# Patient Record
Sex: FEMALE | Race: WHITE | NOT HISPANIC OR LATINO | Employment: UNEMPLOYED | URBAN - METROPOLITAN AREA
[De-identification: names, ages, dates, MRNs, and addresses within clinical notes are randomized per-mention and may not be internally consistent; named-entity substitution may affect disease eponyms.]

---

## 2017-09-08 ENCOUNTER — PATIENT OUTREACH (OUTPATIENT)
Dept: ADMINISTRATIVE | Facility: HOSPITAL | Age: 22
End: 2017-09-08

## 2017-09-08 NOTE — LETTER
September 8, 2017    Cony Lora  [address]  [facility] [address]             [facility]  [address]  [facility] [address]  Phone: [phone] Dear MsMartha Lora:    [facility] is committed to your overall health.  To help you get the most out of each of your visits, we will review your information to make sure you are up to date on all of your recommended tests and/or procedures.      Laron Schreiber MD has found that you may be due for   Health Maintenance Due   Topic    Lipid Panel     HPV Vaccines (1 of 3 - Female 3 Dose Series)    Pap Smear     Influenza Vaccine         If you have had any of the above done at another facility, please bring the records or information with you so that your record at [facility] will be complete.    If you are currently taking medication, please bring it with you to your appointment for review.    We will be happy to assist you with scheduling any necessary appointments or you may contact the [facility] appointment desk at [phone] to schedule at your convenience.     Thank you for choosing [facility] for your healthcare needs,    Moni HENRY LPN Care Coordinator  [facility]  [phone]

## 2017-09-08 NOTE — LETTER
September 8, 2017    Cony Odessasean Lora  [address]  [facility] [address]             [facility]  [address]  [facility] [address]  Phone: [phone] Dear MsMartha Lora:    [facility] is committed to your overall health.  To help you get the most out of each of your visits, we will review your information to make sure you are up to date on all of your recommended tests and/or procedures.      DR. MALINDA BARDALES has found that you may be due for   Health Maintenance Due   Topic    Lipid Panel     HPV Vaccines (1 of 3 - Female 3 Dose Series)    Pap Smear     Influenza Vaccine         If you have had any of the above done at another facility, please bring the records or information with you so that your record at [facility] will be complete.    If you are currently taking medication, please bring it with you to your appointment for review.    We will be happy to assist you with scheduling any necessary appointments or you may contact the [facility] appointment desk at [phone] to schedule at your convenience.     Thank you for choosing [facility] for your healthcare needs,    Moni HENRY, LPN Care Coordinator  [facility]  [phone]

## 2017-09-22 ENCOUNTER — OFFICE VISIT (OUTPATIENT)
Dept: INTERNAL MEDICINE | Facility: CLINIC | Age: 22
End: 2017-09-22
Payer: MEDICAID

## 2017-09-22 ENCOUNTER — LAB VISIT (OUTPATIENT)
Dept: LAB | Facility: HOSPITAL | Age: 22
End: 2017-09-22
Attending: FAMILY MEDICINE
Payer: MEDICAID

## 2017-09-22 VITALS
HEIGHT: 67 IN | OXYGEN SATURATION: 100 % | BODY MASS INDEX: 30.97 KG/M2 | WEIGHT: 197.31 LBS | HEART RATE: 102 BPM | TEMPERATURE: 97 F | DIASTOLIC BLOOD PRESSURE: 70 MMHG | SYSTOLIC BLOOD PRESSURE: 120 MMHG

## 2017-09-22 DIAGNOSIS — F41.1 GAD (GENERALIZED ANXIETY DISORDER): ICD-10-CM

## 2017-09-22 DIAGNOSIS — Z00.00 ROUTINE GENERAL MEDICAL EXAMINATION AT A HEALTH CARE FACILITY: Primary | ICD-10-CM

## 2017-09-22 DIAGNOSIS — Z00.00 ROUTINE GENERAL MEDICAL EXAMINATION AT A HEALTH CARE FACILITY: ICD-10-CM

## 2017-09-22 DIAGNOSIS — E66.9 OBESITY (BMI 30.0-34.9): ICD-10-CM

## 2017-09-22 LAB
ALBUMIN SERPL BCP-MCNC: 3.7 G/DL
ALP SERPL-CCNC: 83 U/L
ALT SERPL W/O P-5'-P-CCNC: 12 U/L
ANION GAP SERPL CALC-SCNC: 11 MMOL/L
AST SERPL-CCNC: 15 U/L
BASOPHILS # BLD AUTO: 0.02 K/UL
BASOPHILS NFR BLD: 0.2 %
BILIRUB SERPL-MCNC: 0.7 MG/DL
BUN SERPL-MCNC: 14 MG/DL
CALCIUM SERPL-MCNC: 9.4 MG/DL
CHLORIDE SERPL-SCNC: 106 MMOL/L
CHOLEST SERPL-MCNC: 248 MG/DL
CHOLEST/HDLC SERPL: 5.4 {RATIO}
CO2 SERPL-SCNC: 23 MMOL/L
CREAT SERPL-MCNC: 0.9 MG/DL
DIFFERENTIAL METHOD: ABNORMAL
EOSINOPHIL # BLD AUTO: 0.1 K/UL
EOSINOPHIL NFR BLD: 0.8 %
ERYTHROCYTE [DISTWIDTH] IN BLOOD BY AUTOMATED COUNT: 13.3 %
EST. GFR  (AFRICAN AMERICAN): >60 ML/MIN/1.73 M^2
EST. GFR  (NON AFRICAN AMERICAN): >60 ML/MIN/1.73 M^2
GLUCOSE SERPL-MCNC: 109 MG/DL
HCT VFR BLD AUTO: 40.1 %
HDLC SERPL-MCNC: 46 MG/DL
HDLC SERPL: 18.5 %
HGB BLD-MCNC: 13.5 G/DL
LDLC SERPL CALC-MCNC: 171.4 MG/DL
LYMPHOCYTES # BLD AUTO: 2.5 K/UL
LYMPHOCYTES NFR BLD: 26.2 %
MCH RBC QN AUTO: 29.7 PG
MCHC RBC AUTO-ENTMCNC: 33.7 G/DL
MCV RBC AUTO: 88 FL
MONOCYTES # BLD AUTO: 0.4 K/UL
MONOCYTES NFR BLD: 4.2 %
NEUTROPHILS # BLD AUTO: 6.6 K/UL
NEUTROPHILS NFR BLD: 68.3 %
NONHDLC SERPL-MCNC: 202 MG/DL
PLATELET # BLD AUTO: 374 K/UL
PMV BLD AUTO: 9 FL
POTASSIUM SERPL-SCNC: 3.9 MMOL/L
PROT SERPL-MCNC: 7.6 G/DL
RBC # BLD AUTO: 4.55 M/UL
SODIUM SERPL-SCNC: 140 MMOL/L
TRIGL SERPL-MCNC: 153 MG/DL
TSH SERPL DL<=0.005 MIU/L-ACNC: 2.7 UIU/ML
WBC # BLD AUTO: 9.68 K/UL

## 2017-09-22 PROCEDURE — 99999 PR PBB SHADOW E&M-EST. PATIENT-LVL IV: CPT | Mod: PBBFAC,,, | Performed by: FAMILY MEDICINE

## 2017-09-22 PROCEDURE — 85025 COMPLETE CBC W/AUTO DIFF WBC: CPT

## 2017-09-22 PROCEDURE — 90471 IMMUNIZATION ADMIN: CPT | Mod: PBBFAC,PO

## 2017-09-22 PROCEDURE — 99214 OFFICE O/P EST MOD 30 MIN: CPT | Mod: PBBFAC,PO | Performed by: FAMILY MEDICINE

## 2017-09-22 PROCEDURE — 36415 COLL VENOUS BLD VENIPUNCTURE: CPT | Mod: PO

## 2017-09-22 PROCEDURE — 99385 PREV VISIT NEW AGE 18-39: CPT | Mod: 25,S$PBB,, | Performed by: FAMILY MEDICINE

## 2017-09-22 PROCEDURE — 80053 COMPREHEN METABOLIC PANEL: CPT

## 2017-09-22 PROCEDURE — 84443 ASSAY THYROID STIM HORMONE: CPT

## 2017-09-22 PROCEDURE — 80061 LIPID PANEL: CPT

## 2017-09-22 RX ORDER — BUPROPION HYDROCHLORIDE 100 MG/1
100 TABLET ORAL DAILY
Qty: 30 TABLET | Refills: 1 | Status: SHIPPED | OUTPATIENT
Start: 2017-09-22 | End: 2019-04-04 | Stop reason: ALTCHOICE

## 2017-09-22 NOTE — PROGRESS NOTES
"Subjective:       Patient ID: Cony Lora is a 22 y.o. female.    Chief Complaint: Annual Exam    22-year-old female patient new to my clinic here to establish care previously seen by Dr. Laron Schreiber.   Patient with Patient Active Problem List:     ARMANDO (generalized anxiety disorder)  Reports that she's been taking Xanax daily.   Has not tried any daily medications long-term in the past  Reports that she stays anxious and occasionally has been depressed and would like to see psychiatry and psychology.  Denies of any suicidal or homicidal thoughts  Menstrual cycles have been stable, will be due for Pap smear this October in [facility].   Denies of any other complaints today           Review of Systems   Constitutional: Negative for fatigue.   Eyes: Negative for visual disturbance.   Respiratory: Negative for shortness of breath.    Cardiovascular: Negative for chest pain, palpitations and leg swelling.   Gastrointestinal: Negative for abdominal pain, nausea and vomiting.   Genitourinary: Negative for menstrual problem.   Musculoskeletal: Negative for myalgias.   Skin: Negative for rash.   Neurological: Negative for light-headedness and headaches.   Psychiatric/Behavioral: Negative for behavioral problems and sleep disturbance. The patient is nervous/anxious.          /70   Pulse 102   Temp 96.9 °F (36.1 °C) (Tympanic)   Ht 5' 7" (1.702 m)   Wt 89.5 kg (197 lb 5 oz)   LMP 09/15/2017   SpO2 100%   BMI 30.90 kg/m²   Objective:      Physical Exam   Constitutional: She is oriented to person, place, and time. She appears well-developed and well-nourished.   HENT:   Head: Normocephalic and atraumatic.   Mouth/Throat: Oropharynx is clear and moist.   Cardiovascular: Normal rate, regular rhythm and normal heart sounds.    No murmur heard.  Pulmonary/Chest: Effort normal and breath sounds normal. She has no wheezes.   Abdominal: Soft. Bowel sounds are normal. There is no tenderness. "   Musculoskeletal: She exhibits no edema.   Neurological: She is alert and oriented to person, place, and time.   Skin: Skin is warm and dry. No rash noted.   Psychiatric: She has a normal mood and affect.         Assessment:       1. Routine general medical examination at a health care facility    2. ARMANDO (generalized anxiety disorder)    3. Obesity (BMI 30.0-34.9)        Plan:   Routine general medical examination at a health care facility  -     CBC auto differential; Future; Expected date: 09/22/2017  -     Comprehensive metabolic panel; Future; Expected date: 09/22/2017  -     Lipid panel; Future; Expected date: 09/22/2017  -     TSH; Future; Expected date: 09/22/2017  -     Urinalysis; Future; Expected date: 09/22/2017  Vital signs stable today.  Clinical exam stable.   Will check fasting labs today  Encouraged to maintain lifestyle modifications with low-fat and low-cholesterol diet and exercise 30 minutes daily to lose weight with BMI 30    ARMANDO (generalized anxiety disorder)  -     TSH; Future; Expected date: 09/22/2017  -     buPROPion (WELLBUTRIN) 100 MG tablet; Take 1 tablet (100 mg total) by mouth once daily.  Dispense: 30 tablet; Refill: 1  -     Ambulatory referral to Psychiatry  -     Ambulatory referral to Psychology  Will start on Wellbutrin 100 mg daily, patient was encouraged to take Xanax only as needed but not daily.   Will refer to psychiatry and psychology for further evaluation and management  If having any side effects taking this medication, please let us know, and if he can get into [facility] schedule, if cannot see psychiatry in a month, will try to bring her back in a month.     Obesity (BMI 30.0-34.9)-lifestyle modifications recommended with diet and exercise to lose weight with BMI 30    Other orders  -     Influenza - Quadrivalent (3 years & older) (PF)  Flu shot given today

## 2017-09-22 NOTE — LETTER
September 22, 2017      [facility]  [address] [facility] [address]           [facility]  [address]  [place] [address]  Phone: [phone]  Fax: [phone]          Patient: Cony Lora   MR Number: [id number]   YOB: 1995   Date of Visit: 9/22/2017       Dear [facility]:    Thank you for referring Cony Lora to me for evaluation. Attached you will find relevant portions of my assessment and plan of care.    If you have questions, please do not hesitate to call me. I look forward to following Cony Lora along with you.    Sincerely,    Hilda Kennedy MD    Enclosure  CC:  No Recipients    If you would like to receive this communication electronically, please contact [email or web address] or [phone] to request more information on [facility] Link access.    For providers and/or their staff who would like to refer a patient to [facility], please contact us through our one-stop-shop provider referral line, Jorge Lema, at [phone].    If you feel you have received this communication in error or would no longer like to receive these types of communications, please e-mail [email or web address]

## 2017-10-12 ENCOUNTER — OFFICE VISIT (OUTPATIENT)
Dept: PSYCHIATRY | Facility: CLINIC | Age: 22
End: 2017-10-12
Payer: MEDICAID

## 2017-10-12 DIAGNOSIS — F41.1 GAD (GENERALIZED ANXIETY DISORDER): Primary | ICD-10-CM

## 2017-10-12 DIAGNOSIS — Z63.9 FAMILY DYNAMICS PROBLEM: ICD-10-CM

## 2017-10-12 PROCEDURE — 90791 PSYCH DIAGNOSTIC EVALUATION: CPT | Mod: AJ,HB,S$PBB, | Performed by: SOCIAL WORKER

## 2017-10-12 PROCEDURE — 90791 PSYCH DIAGNOSTIC EVALUATION: CPT | Mod: PBBFAC,PO | Performed by: SOCIAL WORKER

## 2017-10-12 SDOH — SOCIAL DETERMINANTS OF HEALTH (SDOH): PROBLEM RELATED TO PRIMARY SUPPORT GROUP, UNSPECIFIED: Z63.9
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
## 2017-11-03 ENCOUNTER — OFFICE VISIT (OUTPATIENT)
Dept: PSYCHIATRY | Facility: CLINIC | Age: 22
End: 2017-11-03
Payer: MEDICAID

## 2017-11-03 DIAGNOSIS — F41.1 GAD (GENERALIZED ANXIETY DISORDER): Primary | ICD-10-CM

## 2017-11-03 DIAGNOSIS — Z63.9 FAMILY DYNAMICS PROBLEM: ICD-10-CM

## 2017-11-03 PROCEDURE — 90834 PSYTX W PT 45 MINUTES: CPT | Mod: PBBFAC,PO | Performed by: SOCIAL WORKER

## 2017-11-03 PROCEDURE — 90834 PSYTX W PT 45 MINUTES: CPT | Mod: AJ,HB,S$PBB, | Performed by: SOCIAL WORKER

## 2017-11-03 SDOH — SOCIAL DETERMINANTS OF HEALTH (SDOH): PROBLEM RELATED TO PRIMARY SUPPORT GROUP, UNSPECIFIED: Z63.9

## 2017-11-15 NOTE — PROGRESS NOTES
Individual Psychotherapy (PhD/LCSW)    11/3/2017    Site:  [place]         Therapeutic Intervention: Met with patient.  Outpatient - Insight oriented psychotherapy 45 min - CPT code 66839 and Outpatient - Supportive psychotherapy 45 min - CPT Code 70884    Chief complaint/reason for encounter: anxiety and interpersonal     Interval history and content of current session:   22 year old female patient returned to clinic for follow up psychotherapy to address chronic generalized anxiety, familial interpersonal conflicts and need for improved coping skills.  Patient presented alert and oriented for her second session, casually dressed, appropriately groomed, pleasant but tense.  Patient reported she has been weaning herself down on her Xanax dose, down to 0.25mg every one to three days.  She talked about conflict with her father, which she described as continuing to be volatile, despite allegedly having stopped alcohol consumption.  She remains somewhat concerned for her mother, who she said just seems more sad lately.  Patient contiues to be dependent on them financially, with goal of calming down enough to get and maintain gainful employment, rather than quitting at the first sign of job stress, as has been her pattern.  Denied any depression, si/hi, psychosis, mood swings, or substance abuase.      Treatment plan:  · Target symptoms: anxiety , adjustment, interpersonal  · Why chosen therapy is appropriate versus another modality: relevant to diagnosis  · Outcome monitoring methods: self-report, observation  · Therapeutic intervention type: insight oriented psychotherapy    Risk parameters:  Patient reports no suicidal ideation  Patient reports no homicidal ideation  Patient reports no self-injurious behavior  Patient reports no violent behavior    Verbal deficits: None    Patient's response to intervention:  The patient's response to intervention is accepting.    Progress toward goals and other mental status  changes:  The patient's progress toward goals is limited.    Diagnosis:     ICD-10-CM ICD-9-CM   1. ARMANDO (generalized anxiety disorder) F41.1 300.02   2. Family dynamics problem Z63.9 V61.9       Plan:  individual psychotherapy and medication management by physician    Return to clinic: as scheduled    Length of Service (minutes): 45

## 2017-12-15 ENCOUNTER — PATIENT MESSAGE (OUTPATIENT)
Dept: PSYCHIATRY | Facility: CLINIC | Age: 22
End: 2017-12-15

## 2017-12-29 ENCOUNTER — OFFICE VISIT (OUTPATIENT)
Dept: PSYCHIATRY | Facility: CLINIC | Age: 22
End: 2017-12-29
Payer: MEDICAID

## 2017-12-29 DIAGNOSIS — Z63.8 FAMILY DISCORD: ICD-10-CM

## 2017-12-29 DIAGNOSIS — F41.1 GAD (GENERALIZED ANXIETY DISORDER): Primary | ICD-10-CM

## 2017-12-29 PROCEDURE — 90834 PSYTX W PT 45 MINUTES: CPT | Mod: AJ,HB,S$PBB, | Performed by: SOCIAL WORKER

## 2017-12-29 PROCEDURE — 90834 PSYTX W PT 45 MINUTES: CPT | Mod: PBBFAC,PO | Performed by: SOCIAL WORKER

## 2017-12-29 SDOH — SOCIAL DETERMINANTS OF HEALTH (SDOH): OTHER SPECIFIED PROBLEMS RELATED TO PRIMARY SUPPORT GROUP: Z63.8
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
## 2018-10-10 ENCOUNTER — OFFICE VISIT (OUTPATIENT)
Dept: PSYCHIATRY | Facility: CLINIC | Age: 23
End: 2018-10-10
Payer: MEDICAID

## 2018-10-10 DIAGNOSIS — F41.1 GAD (GENERALIZED ANXIETY DISORDER): Primary | ICD-10-CM

## 2018-10-10 DIAGNOSIS — Z63.9 FAMILY DYNAMICS PROBLEM: ICD-10-CM

## 2018-10-10 PROCEDURE — 90834 PSYTX W PT 45 MINUTES: CPT | Mod: PBBFAC,PO | Performed by: SOCIAL WORKER

## 2018-10-10 PROCEDURE — 90834 PSYTX W PT 45 MINUTES: CPT | Mod: AJ,HB,S$PBB, | Performed by: SOCIAL WORKER

## 2018-10-10 SDOH — SOCIAL DETERMINANTS OF HEALTH (SDOH): PROBLEM RELATED TO PRIMARY SUPPORT GROUP, UNSPECIFIED: Z63.9
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
## 2018-11-07 ENCOUNTER — OFFICE VISIT (OUTPATIENT)
Dept: PSYCHIATRY | Facility: CLINIC | Age: 23
End: 2018-11-07
Payer: MEDICAID

## 2018-11-07 DIAGNOSIS — F41.1 GAD (GENERALIZED ANXIETY DISORDER): Primary | ICD-10-CM

## 2018-11-07 DIAGNOSIS — Z63.9 FAMILY DYSFUNCTION: ICD-10-CM

## 2018-11-07 PROCEDURE — 90834 PSYTX W PT 45 MINUTES: CPT | Mod: PBBFAC,PO | Performed by: SOCIAL WORKER

## 2018-11-07 PROCEDURE — 90834 PSYTX W PT 45 MINUTES: CPT | Mod: AJ,HB,S$PBB, | Performed by: SOCIAL WORKER

## 2018-11-07 SDOH — SOCIAL DETERMINANTS OF HEALTH (SDOH): PROBLEM RELATED TO PRIMARY SUPPORT GROUP, UNSPECIFIED: Z63.9
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
## 2018-12-13 ENCOUNTER — OFFICE VISIT (OUTPATIENT)
Dept: PSYCHIATRY | Facility: CLINIC | Age: 23
End: 2018-12-13
Payer: MEDICAID

## 2018-12-13 DIAGNOSIS — F41.1 GAD (GENERALIZED ANXIETY DISORDER): Primary | ICD-10-CM

## 2018-12-13 DIAGNOSIS — Z63.9 RELATIONSHIP PROBLEMS: ICD-10-CM

## 2018-12-13 PROCEDURE — 99212 OFFICE O/P EST SF 10 MIN: CPT | Mod: PBBFAC,25,PO | Performed by: PSYCHIATRY & NEUROLOGY

## 2018-12-13 PROCEDURE — 90792 PSYCH DIAG EVAL W/MED SRVCS: CPT | Mod: PBBFAC,PO | Performed by: PSYCHIATRY & NEUROLOGY

## 2018-12-13 PROCEDURE — 99999 PR PBB SHADOW E&M-EST. PATIENT-LVL II: CPT | Mod: PBBFAC,,, | Performed by: PSYCHIATRY & NEUROLOGY

## 2018-12-13 PROCEDURE — 90834 PSYTX W PT 45 MINUTES: CPT | Mod: PBBFAC,PO | Performed by: SOCIAL WORKER

## 2018-12-13 PROCEDURE — 90792 PSYCH DIAG EVAL W/MED SRVCS: CPT | Mod: AF,HB,S$PBB, | Performed by: PSYCHIATRY & NEUROLOGY

## 2018-12-13 PROCEDURE — 90834 PSYTX W PT 45 MINUTES: CPT | Mod: AJ,HB,S$PBB, | Performed by: SOCIAL WORKER

## 2018-12-13 RX ORDER — DULOXETIN HYDROCHLORIDE 30 MG/1
CAPSULE, DELAYED RELEASE ORAL
Qty: 60 CAPSULE | Refills: 2 | Status: SHIPPED | OUTPATIENT
Start: 2018-12-13 | End: 2019-01-18 | Stop reason: DRUGHIGH

## 2018-12-13 RX ORDER — TRAZODONE HYDROCHLORIDE 100 MG/1
TABLET ORAL
Qty: 30 TABLET | Refills: 2 | Status: SHIPPED | OUTPATIENT
Start: 2018-12-13 | End: 2019-07-29 | Stop reason: ALTCHOICE

## 2018-12-13 SDOH — SOCIAL DETERMINANTS OF HEALTH (SDOH): PROBLEM RELATED TO PRIMARY SUPPORT GROUP, UNSPECIFIED: Z63.9

## 2018-12-13 NOTE — PROGRESS NOTES
"Individual Psychotherapy (PhD/LCSW)    12/13/2018    [facility]:  [facility]         Therapeutic Intervention: Met with patient.  Outpatient - Insight oriented psychotherapy 45 min - CPT code 86331 and Outpatient - Supportive psychotherapy 45 min - CPT Code 24848    Chief complaint/reason for encounter: anxiety and interpersonal     Interval history and content of current session:   23 year old female patient returned to clinic for follow up psychotherapy to address chronic generalized anxiety,relationship struggles and limited coping skills.  Previously seen 11/7/18.  Patient presented alert and oriented, casually dressed.  Patient was tearful throughout the session, saying she was no longer very anxious about her boyfriend's parents, that those interactions have been going okay.  But she now is focused on a talk her boyfriend of 6 years had with her just two days ago, 11/5/18, in which he commented that she has been repeatedly resistant to looking after niece and nephews, a task that was understood to be a part of how they would help out family in exchange for being housed at his sister's and brother in law's home.  Further, this caused her boyfriend to project worry into the future and ask himself what kind of future could they have as parents if she can't deal with children.  Patient stated she has wanted children of her own for years, wanting "unconditional love" of little, dependent children, who won't  her.  She endorsed "shutting down" emotionally when her boyfriend expressed his concerns, not talking any further.  Discussed with patient her perspective on what she expects from a primary romantic relationship, what she expects from parenthood; also discussed some words she chooses that automatically express ideas in absolutist and fatalist terms, such as "always" and "never."  Discussed some ideas for how to find something constructive to come from difficult conversations with loved ones.  Patient denied any " si/hi, psychosis, cognitive deficits, mood swings, or substance abuse.  Plan is to follow up in clinic 12/28/18.       Treatment plan:  · Target symptoms: anxiety , adjustment, interpersonal  · Why chosen therapy is appropriate versus another modality: relevant to diagnosis  · Outcome monitoring methods: self-report, observation  · Therapeutic intervention type: insight oriented psychotherapy    Risk parameters:  Patient reports no suicidal ideation  Patient reports no homicidal ideation  Patient reports no self-injurious behavior  Patient reports no violent behavior    Verbal deficits: None    Patient's response to intervention:  The patient's response to intervention is accepting.    Progress toward goals and other mental status changes:  The patient's progress toward goals is limited.    Diagnosis:     ICD-10-CM ICD-9-CM   1. ARMANDO (generalized anxiety disorder) F41.1 300.02   2. Relationship problems Z63.9 313.3       Plan:  individual psychotherapy and medication management by physician    Return to clinic: as scheduled    Length of Service (minutes): 45

## 2018-12-13 NOTE — PROGRESS NOTES
"Outpatient Psychiatry Initial Visit (MD/NP)    12/13/2018    Cony Lora, a 23 y.o. female, presenting for initial evaluation visit. Met with patient.    Reason for Encounter: Patient complains of anxiety.    History of Present Illness: Pt is a 22 y/o F who presents for establishment of care, referred by Dr. Kennedy, seen for psychotherapy by Javier Cardoso starting in July. Reports anxiety symptoms back to middle school, probably 7th grade, starting with social anxiety, worrying about others' judgement. Symptoms have gotten worse, more pervasive, and with concern for personal safety, meta-anxiety about anxiety, avoidance. Denies history of jovani trauma. Says that she "doesn't go anywhere anymore", dropped out of  due to anxiety. has lost or quit multiple jobs due to symptoms/attendance. Understands nature of her fear is exaggerated.   Has taken xanax (remote) from Dr. Laron Schreiber about 5 days/week.   Was prescribed wellbutrin by Dr. Kennedy, but just managed two doses before self-discontinuing due to intolerable side effects (tingling).      Started therapy last year. Interrupted by insurance loss, then back. Thinks therapy helps. Xanax helps when she has a panic attack. Sometimes able to abort prior to attacks by deep breathing and self-talk. Depression since about age 16 after a bad relationship.     Past meds: buprop - side effects.   Sertraline - more depressed.     FamHx: mom, dad, sister - anxiety. "but I'm the worst".     Psych Hx: as above. Attempts at work have been unsuccessful due to "doubt myself", panic attacks, avoidance.     Social Hx: see below. Lives with boyfriend and his brother and sister-in-law. Supportive relationship. No maltreatment. Good relationship with mom, 2 brothers, "so-so with sister", not much with dad (who has been quite critical; dad also doesn't approve of her bf who is black. Dad is also an alcoholic).     History of present illness: 22 year old  female presented " for initial evaluation, referred by Hilda Kennedy MD, in Internal Medicine, with chief complaint of chronic generalized anxiety, sleep deficits, frequent tearfulness, 40 lbs weight gain over approximately the past year. Reporting difficulty following through to complete tasks but considers it laziness & procrastination, not particularly focus difficulty. Endorsed difficulty with motivation with many tasks, as long as she can recall. Hated school both socially & academically. Pt described distinct family history of anxiety in both parents & sister. Pt stated she dropped out of the 11th grade secondary to anxiety & quickly obtained her GED.  Reported stress in family from other's alcohol use problems & his negativity & intrusiveness when under the influence. Pt said she started to distance herself as a teen.  Reported also a lot of racial tension as patient has dated inter-racially & father angrily disapproved and been insulting. She has been in her current committed relationship the past 5 years, & her boyfriend is . Pt said she has been unable to maintain employment due to her anxiety, bailing out of jobs as a result of insecurity on new jobs. She said she is supported by her mother & boyfriend. Semi-active [employer] but will watch services on TV rather than face people live in [employer] services. Pt denied any si/hi, psychosis, cognitive deficit, rages, mood swings, or substance use problems. Identified therapeutic goals include reducing anxiety & improving coping skills for anxiety and interpersonal conflict.      Symptoms:   ? Mood: tearfulness and poor motivation  ? Anxiety: excessive anxiety/worry and social phobia, decreased sleep  ? Substance abuse: denied  ? Cognitive functioning: denied  ? Health behaviors: noncontributory     Psychiatric history: psychotropic management by PCP     Medical history: 40 pounds weight gain in the past year; currently moderately obese; kidney stones; denied any  other physical/medical conditions     Family history of psychiatric illness: Mother, father, and sister all markedly anxious; father also with chronic alcohol use problems. Paternal grandfather also reported to be alcoholic.     Social history: Grew up in [place], the youngest of 4 siblings; has two brothers & a sister. Raised by both parents who remain together. Pt reported father's alcohol abuse led to many conflicts & tensions in the family home; patient feeling undermined, judged, not at all affirmed by him. She reported hating school, due in significant part to her anxiety. Reports chronic fear of judgment and inadequacy; impulse is to avoid social situations. Described father as having always been negative & judgmental as long as she can remember. Said father's mother was also the same way, very negative, prejudiced, & controlling. Pt very aware of racist/racially intollerant attitudes in her father & paternal grandparents. Reports she dropped out of school & promptly obtained her GED at age 15. Reported 2 committed relationships, the first one ending at age 17; said it was a bad relationship. Has been with current boyfriend the past 5 years; describing him as supportive & understanding. Reported both relationships have been interracial, evoking much conflict with her father & his side of the family. She has just avoided him as much as possible. Pt unemployed, feeling unable to tolerate the social aspect of work settings. Reports being financially supported by boyfriend & her mother. No children; never . No  experience.       Substance use: Alcohol: infrequent   Drugs: sporadic marijuana use from age 13 to current; last reported use August 17th.   Tobacco: former smoker age 13 to 18   Caffeine: none    Review Of Systems:     GENERAL:  No weight gain or loss  SKIN:  No rashes or lacerations  HEAD:  No headaches  EYES:  No exophthalmos, jaundice or blindness  EARS:  No dizziness, tinnitus or  hearing loss  NOSE:  No changes in smell  MOUTH & THROAT:  No dyskinetic movements or obvious goiter  CHEST:  No shortness of breath, hyperventilation or cough  CARDIOVASCULAR:  No tachycardia or chest pain  ABDOMEN:  No nausea, vomiting, pain, constipation or diarrhea  URINARY:  No frequency, dysuria or sexual dysfunction  ENDOCRINE:  No polydipsia, polyuria  MUSCULOSKELETAL:  No pain or stiffness of the joints  NEUROLOGIC:  No weakness, sensory changes, seizures, confusion, memory loss, tremor or other abnormal movements    Current Evaluation:     Nutritional Screening: Considering the patient's height and weight, medications, medical history and preferences, should a referral be made to the dietitian? no    Constitutional  Vitals:  Most recent vital signs, dated less than 90 days prior to this appointment, were not reviewed.    There were no vitals filed for this visit.     General:  unremarkable, age appropriate     Musculoskeletal  Muscle Strength/Tone:  no tremor, no tic   Gait & Station:  non-ataxic     Psychiatric  Appearance: casually dressed & groomed;   Behavior: calm,   Cooperation: cooperative with assessment  Speech: normal rate, volume, tone  Thought Process: linear, goal-directed  Thought Content: No suicidal or homicidal ideation; no delusions  Affect: anxious  Mood: anxious  Perceptions: No auditory or visual hallucinations  Level of Consciousness: alert throughout interview  Insight: fair  Cognition: Oriented to person, place, time, & situation  Memory: no apparent deficits to general clinical interview; not formally assessed  Attention/Concentration: no apparent deficits to general clinical interview; not formally assessed  Fund of Knowledge: average by vocabulary/education    Laboratory Data  No visits with results within 1 Month(s) from this visit.   Latest known visit with results is:   Lab Visit on 09/22/2017   Component Date Value Ref Range Status    Specimen UA 09/22/2017 Urine, Clean  Catch   Final    Color, UA 09/22/2017 Yellow  Yellow, Straw, Julia Final    Appearance, UA 09/22/2017 Clear  Clear Final    pH, UA 09/22/2017 6.0  5.0 - 8.0 Final    Specific Gravity, UA 09/22/2017 1.015  1.005 - 1.030 Final    Protein, UA 09/22/2017 Negative  Negative Final    Glucose, UA 09/22/2017 Negative  Negative Final    Ketones, UA 09/22/2017 Negative  Negative Final    Bilirubin (UA) 09/22/2017 Negative  Negative Final    Occult Blood UA 09/22/2017 1+* Negative Final    Nitrite, UA 09/22/2017 Negative  Negative Final    Leukocytes, UA 09/22/2017 Negative  Negative Final    RBC, UA 09/22/2017 4  0 - 4 /hpf Final    Microscopic Comment 09/22/2017 SEE COMMENT   Final     Medications  Outpatient Encounter Medications as of 12/13/2018   Medication Sig Dispense Refill    alprazolam (XANAX) 0.5 MG tablet Take 0.5 mg by mouth 2 (two) times daily as needed.  4    buPROPion (WELLBUTRIN) 100 MG tablet Take 1 tablet (100 mg total) by mouth once daily. 30 tablet 1    SPRINTEC, 28, 0.25-35 mg-mcg per tablet Take 1 tablet by mouth once daily.  11     No facility-administered encounter medications on file as of 12/13/2018.      Assessment - Diagnosis - Goals:     Impression: 22 y/o F with severe and pervasive anxiety, dysfunction in multiple life roles including occupational dysfunction. Has had limited previous treatment, modest benefits from xanax and psychotherapy.     Dx: Generalized anxiety disorder    Treatment Goals:  Specify outcomes written in observable, behavioral terms: reduce symptoms by self-reports and scales. Improve functioning.     Treatment Plan/Recommendations:   · Trial of duloxetine for mood, trazodone prn sleep.   · Continue psychotherapy.   · Discussed risks, benefits, and alternatives to treatment plan documented above with patient. I answered all patient questions related to this plan and patient expressed understanding and agreement.     Return to Clinic: 2 months    Counseling  time: 10 minutes  Total time: 50 minutes    ELAINE Ga MD  Psychiatry  [facility]  [address] , [place] [address]  [phone]

## 2018-12-28 ENCOUNTER — OFFICE VISIT (OUTPATIENT)
Dept: PSYCHIATRY | Facility: CLINIC | Age: 23
End: 2018-12-28
Payer: MEDICAID

## 2018-12-28 DIAGNOSIS — Z63.9 FAMILY DYNAMICS PROBLEM: ICD-10-CM

## 2018-12-28 DIAGNOSIS — F41.1 GAD (GENERALIZED ANXIETY DISORDER): Primary | ICD-10-CM

## 2018-12-28 PROCEDURE — 90834 PSYTX W PT 45 MINUTES: CPT | Mod: PBBFAC,PO | Performed by: SOCIAL WORKER

## 2018-12-28 PROCEDURE — 90834 PSYTX W PT 45 MINUTES: CPT | Mod: S$PBB,AJ,HB, | Performed by: SOCIAL WORKER

## 2018-12-28 SDOH — SOCIAL DETERMINANTS OF HEALTH (SDOH): PROBLEM RELATED TO PRIMARY SUPPORT GROUP, UNSPECIFIED: Z63.9

## 2018-12-28 NOTE — PROGRESS NOTES
Individual Psychotherapy (PhD/LCSW)    12/28/2018    [facility]         Therapeutic Intervention: Met with patient.  Outpatient - Insight oriented psychotherapy 45 min - CPT code 08878 and Outpatient - Supportive psychotherapy 45 min - CPT Code 88157    Chief complaint/reason for encounter: anxiety and interpersonal     Interval history and content of current session:   23 year old female patient returned to clinic for follow up psychotherapy to address chronic generalized anxiety, family dynamics problems with her family of origin, and limited coping skills.  Previuosly seen 12/13/18.  Patient presented alert and oriented, casually dressed, smiling.  She reported feeling much calmer now on her new Cymbalta medication, now at 60mg.  She described some moderate nausea side effects that have largely resolved by now, but still some appetite suppressant effect.  She reported feeling tremendous relief for her anxiety, which had been causing her to withdraw socially and to constantly imagine worst case scenarios for a host of situations, leading her boyfriend to begin to express some exasperation with her negativity.  She reported they are talking more comfortably to each other now, and she is looking forward to things.  The patient reported that she had repeatedly turned down social invitations to spend time with her [place] of friends,  Saying now she had been away from for over a year, only hearing regularly from one friend, whom she said repeatedly sent her invitations to socialize.  She said she had repeatedly declined these invitations for months at a time, but she decided to accept this time and had a wonderful get together with friends shortly before Florentin, several expressing delight at seeing her again.  Talked some about her past disappointment with medications--Zoloft and Wellbutrin and her delight at the effect she is experiencing from the Cymbalta.  Discussed some useful self-talk habits to work on  while anxiety is at bay to better manage worry when it does arise.  Patient denied any si/hi, psychosis, cognitive deficits, mood swings, or substance abuse.  Plan is to follow up in clinic 1/18/19.       Treatment plan:  · Target symptoms: anxiety , adjustment, interpersonal  · Why chosen therapy is appropriate versus another modality: relevant to diagnosis  · Outcome monitoring methods: self-report, observation  · Therapeutic intervention type: insight oriented psychotherapy    Risk parameters:  Patient reports no suicidal ideation  Patient reports no homicidal ideation  Patient reports no self-injurious behavior  Patient reports no violent behavior    Verbal deficits: None    Patient's response to intervention:  The patient's response to intervention is accepting.    Progress toward goals and other mental status changes:  The patient's progress toward goals is good.    Diagnosis:     ICD-10-CM ICD-9-CM   1. ARMANDO (generalized anxiety disorder) F41.1 300.02   2. Family dynamics problem Z63.9 V61.9       Plan:  individual psychotherapy and medication management by physician    Return to clinic: as scheduled    Length of Service (minutes): 45

## 2019-01-18 RX ORDER — DULOXETIN HYDROCHLORIDE 60 MG/1
60 CAPSULE, DELAYED RELEASE ORAL DAILY
Qty: 30 CAPSULE | Refills: 2 | Status: SHIPPED | OUTPATIENT
Start: 2019-01-18 | End: 2019-04-04 | Stop reason: SDUPTHER

## 2019-02-04 ENCOUNTER — OFFICE VISIT (OUTPATIENT)
Dept: PSYCHIATRY | Facility: CLINIC | Age: 24
End: 2019-02-04
Payer: MEDICAID

## 2019-02-04 VITALS
HEART RATE: 94 BPM | DIASTOLIC BLOOD PRESSURE: 96 MMHG | BODY MASS INDEX: 32.87 KG/M2 | WEIGHT: 209.88 LBS | SYSTOLIC BLOOD PRESSURE: 120 MMHG

## 2019-02-04 DIAGNOSIS — F41.1 GAD (GENERALIZED ANXIETY DISORDER): ICD-10-CM

## 2019-02-04 DIAGNOSIS — G47.00 INSOMNIA, UNSPECIFIED TYPE: Primary | ICD-10-CM

## 2019-02-04 PROCEDURE — 99214 PR OFFICE/OUTPT VISIT, EST, LEVL IV, 30-39 MIN: ICD-10-PCS | Mod: AF,HB,S$PBB, | Performed by: PSYCHIATRY & NEUROLOGY

## 2019-02-04 PROCEDURE — 99999 PR PBB SHADOW E&M-EST. PATIENT-LVL II: CPT | Mod: PBBFAC,,, | Performed by: PSYCHIATRY & NEUROLOGY

## 2019-02-04 PROCEDURE — 99999 PR PBB SHADOW E&M-EST. PATIENT-LVL II: ICD-10-PCS | Mod: PBBFAC,,, | Performed by: PSYCHIATRY & NEUROLOGY

## 2019-02-04 PROCEDURE — 99212 OFFICE O/P EST SF 10 MIN: CPT | Mod: PBBFAC,PN | Performed by: PSYCHIATRY & NEUROLOGY

## 2019-02-04 PROCEDURE — 99214 OFFICE O/P EST MOD 30 MIN: CPT | Mod: AF,HB,S$PBB, | Performed by: PSYCHIATRY & NEUROLOGY

## 2019-02-04 RX ORDER — DULOXETIN HYDROCHLORIDE 30 MG/1
30 CAPSULE, DELAYED RELEASE ORAL DAILY
Qty: 90 CAPSULE | Refills: 2 | Status: SHIPPED | OUTPATIENT
Start: 2019-02-04 | End: 2019-04-04 | Stop reason: SDUPTHER

## 2019-02-04 RX ORDER — ZOLPIDEM TARTRATE 5 MG/1
TABLET ORAL
Qty: 30 TABLET | Refills: 2 | Status: SHIPPED | OUTPATIENT
Start: 2019-02-04 | End: 2019-07-29 | Stop reason: ALTCHOICE

## 2019-02-04 NOTE — PROGRESS NOTES
"Outpatient Psychiatry Follow-up Visit (MD/NP)    2/4/2019    Cony Lora, a 24 y.o. female, presenting for follow-up visit. Met with patient.    Reason for Encounter: Patient complains of anxiety.    Interval History: Patient seen & interviewed, last seen about 2 months ago. Social anxiety symptoms ongoing. Less tense, suspicious, worrying.   Appetite and social withdrawal symptoms. Sleeping med not working well. No new health problems.   No new meds. Same circumstances and routines.   No side effects. Adherence is good. Initiation nausea - then got used to it.     Background: Pt is a 24 y/o F who presents for establishment of care, referred by Dr. Kennedy, seen for psychotherapy by Javier Cardoso starting in July. Reports anxiety symptoms back to middle school, probably 7th grade, starting with social anxiety, worrying about others' judgement. Symptoms have gotten worse, more pervasive, and with concern for personal safety, meta-anxiety about anxiety, avoidance. Denies history of jovani trauma. Says that she "doesn't go anywhere anymore", dropped out of [employer] due to anxiety. has lost or quit multiple jobs due to symptoms/ attendance. Understands nature of her fear is exaggerated. Has taken xanax (remote) from Dr. Laron Schreiber about 5 days/week. Was prescribed wellbutrin by Dr. Kennedy, but just managed two doses before self-discontinuing due to intolerable side effects (tingling).      Started therapy last year. Interrupted by insurance loss, then back. Thinks therapy helps. Xanax helps when she has a panic attack. Sometimes able to abort prior to attacks by deep breathing and self-talk. Depression since about age 16 after a bad relationship.     Past meds: buprop - side effects.   Sertraline - more depressed.     FamHx: mom, dad, sister - anxiety. "but I'm the worst".     Psych Hx: as above. Attempts at work have been unsuccessful due to "doubt myself", panic attacks, avoidance.     Social Hx: see below. Lives with " "boyfriend and his brother and sister-in-law. Supportive relationship. No maltreatment. Good relationship with mom, 2 brothers, "so-so with sister", not much with dad (who has been quite critical; dad also doesn't approve of her bf who is black. Dad is also an alcoholic).     History of present illness: 22 year old  female presented for initial evaluation, referred by Hilda Kennedy MD, in Internal Medicine, with chief complaint of chronic generalized anxiety, sleep deficits, frequent tearfulness, 40 lbs weight gain over approximately the past year. Reporting difficulty following through to complete tasks but considers it laziness & procrastination, not particularly focus difficulty. Endorsed difficulty with motivation with many tasks, as long as she can recall. Hated school both socially & academically. Pt described distinct family history of anxiety in both parents & sister. Pt stated she dropped out of the 11th grade secondary to anxiety & quickly obtained her GED.  Reported stress in family from other's alcohol use problems & his negativity & intrusiveness when under the influence. Pt said she started to distance herself as a teen.  Reported also a lot of racial tension as patient has dated inter-racially & father angrily disapproved and been insulting. She has been in her current committed relationship the past 5 years, & her boyfriend is . Pt said she has been unable to maintain employment due to her anxiety, bailing out of jobs as a result of insecurity on new jobs. She said she is supported by her mother & boyfriend. Semi-active [employer] but will watch services on TV rather than face people live in [employer] services. Pt denied any si/hi, psychosis, cognitive deficit, rages, mood swings, or substance use problems. Identified therapeutic goals include reducing anxiety & improving coping skills for anxiety and interpersonal conflict.      Symptoms:   ? Mood: tearfulness and poor " motivation  ? Anxiety: excessive anxiety/worry and social phobia, decreased sleep  ? Substance abuse: denied  ? Cognitive functioning: denied  ? Health behaviors: noncontributory     Psychiatric history: psychotropic management by PCP     Medical history: 40 pounds weight gain in the past year; currently moderately obese; kidney stones; denied any other physical/medical conditions     Family history of psychiatric illness: Mother, father, and sister all markedly anxious; father also with chronic alcohol use problems. Paternal grandfather also reported to be alcoholic.     Social history: Grew up in [place], the youngest of 4 siblings; has two brothers & a sister. Raised by both parents who remain together. Pt reported father's alcohol abuse led to many conflicts & tensions in the family home; patient feeling undermined, judged, not at all affirmed by him. She reported hating school, due in significant part to her anxiety. Reports chronic fear of judgment and inadequacy; impulse is to avoid social situations. Described father as having always been negative & judgmental as long as she can remember. Said father's mother was also the same way, very negative, prejudiced, & controlling. Pt very aware of racist/racially intollerant attitudes in her father & paternal grandparents. Reports she dropped out of school & promptly obtained her GED at age 15. Reported 2 committed relationships, the first one ending at age 17; said it was a bad relationship. Has been with current boyfriend the past 5 years; describing him as supportive & understanding. Reported both relationships have been interracial, evoking much conflict with her father & his side of the family. She has just avoided him as much as possible. Pt unemployed, feeling unable to tolerate the social aspect of work settings. Reports being financially supported by boyfriend & her mother. No children; never . No  experience.       Substance use:  Alcohol: infrequent   Drugs: sporadic marijuana use from age 13 to current; last reported use August 17th.   Tobacco: former smoker age 13 to 18   Caffeine: none    Review Of Systems:     GENERAL:  No weight gain or loss  SKIN:  No rashes or lacerations  HEAD:  No headaches  EYES:  No exophthalmos, jaundice or blindness  EARS:  No dizziness, tinnitus or hearing loss  NOSE:  No changes in smell  MOUTH & THROAT:  No dyskinetic movements or obvious goiter  CHEST:  No shortness of breath, hyperventilation or cough  CARDIOVASCULAR:  No tachycardia or chest pain  ABDOMEN:  No nausea, vomiting, pain, constipation or diarrhea  URINARY:  No frequency, dysuria or sexual dysfunction  ENDOCRINE:  No polydipsia, polyuria  MUSCULOSKELETAL:  No pain or stiffness of the joints  NEUROLOGIC:  No weakness, sensory changes, seizures, confusion, memory loss, tremor or other abnormal movements    Current Evaluation:     Nutritional Screening: Considering the patient's height and weight, medications, medical history and preferences, should a referral be made to the dietitian? no    Constitutional  Vitals:  Most recent vital signs, dated less than 90 days prior to this appointment, were not reviewed.    There were no vitals filed for this visit.     General:  unremarkable, age appropriate     Musculoskeletal  Muscle Strength/Tone:  no tremor, no tic   Gait & Station:  non-ataxic     Psychiatric  Appearance: casually dressed & groomed;   Behavior: calm,   Cooperation: cooperative with assessment  Speech: normal rate, volume, tone  Thought Process: linear, goal-directed  Thought Content: No suicidal or homicidal ideation; no delusions  Affect: anxious  Mood: anxious  Perceptions: No auditory or visual hallucinations  Level of Consciousness: alert throughout interview  Insight: fair  Cognition: Oriented to person, place, time, & situation  Memory: no apparent deficits to general clinical interview; not formally  assessed  Attention/Concentration: no apparent deficits to general clinical interview; not formally assessed  Fund of Knowledge: average by vocabulary/education    Laboratory Data  No visits with results within 1 Month(s) from this visit.   Latest known visit with results is:   Lab Visit on 09/22/2017   Component Date Value Ref Range Status    Specimen UA 09/22/2017 Urine, Clean Catch   Final    Color, UA 09/22/2017 Yellow  Yellow, Straw, Julia Final    Appearance, UA 09/22/2017 Clear  Clear Final    pH, UA 09/22/2017 6.0  5.0 - 8.0 Final    Specific Gravity, UA 09/22/2017 1.015  1.005 - 1.030 Final    Protein, UA 09/22/2017 Negative  Negative Final    Glucose, UA 09/22/2017 Negative  Negative Final    Ketones, UA 09/22/2017 Negative  Negative Final    Bilirubin (UA) 09/22/2017 Negative  Negative Final    Occult Blood UA 09/22/2017 1+* Negative Final    Nitrite, UA 09/22/2017 Negative  Negative Final    Leukocytes, UA 09/22/2017 Negative  Negative Final    RBC, UA 09/22/2017 4  0 - 4 /hpf Final    Microscopic Comment 09/22/2017 SEE COMMENT   Final     Medications  Outpatient Encounter Medications as of 2/4/2019   Medication Sig Dispense Refill    alprazolam (XANAX) 0.5 MG tablet Take 0.5 mg by mouth 2 (two) times daily as needed.  4    buPROPion (WELLBUTRIN) 100 MG tablet Take 1 tablet (100 mg total) by mouth once daily. 30 tablet 1    DULoxetine (CYMBALTA) 60 MG capsule Take 1 capsule (60 mg total) by mouth once daily. 30 capsule 2    SPRINTEC, 28, 0.25-35 mg-mcg per tablet Take 1 tablet by mouth once daily.  11    traZODone (DESYREL) 100 MG tablet Take 1/2 to 1 tablet at bedtime as needed for sleep. 30 tablet 2     No facility-administered encounter medications on file as of 2/4/2019.      Assessment - Diagnosis - Goals:     Impression: 23 y/o F with severe and pervasive anxiety, dysfunction in multiple life roles including occupational dysfunction. Has had limited previous treatment, modest  benefits from xanax and psychotherapy. Some benefits from duloxetine. Ongoing sleep symptoms.     Dx: Generalized anxiety disorder    Treatment Goals:  Specify outcomes written in observable, behavioral terms: reduce symptoms by self-reports and scales. Improve functioning.     Treatment Plan/Recommendations:   · Trial of duloxetine for mood, zolpidem in place of trazodone prn sleep.   · Continue psychotherapy.   · Discussed risks, benefits, and alternatives to treatment plan documented above with patient. I answered all patient questions related to this plan and patient expressed understanding and agreement.     Return to Clinic: 2 months    Counseling time: 10 minutes  Total time: 50 minutes    ELAINE Ga MD  Psychiatry  [facility]  [address] , [place], [place] [id number] [phone]

## 2019-03-06 ENCOUNTER — TELEPHONE (OUTPATIENT)
Dept: PSYCHIATRY | Facility: CLINIC | Age: 24
End: 2019-03-06

## 2019-04-03 NOTE — PROGRESS NOTES
"Outpatient Psychiatry Follow-up Visit (MD/NP)    4/4/2019    Cony Lora, a 24 y.o. female, presenting for follow-up visit. Met with patient.    Reason for Encounter: Patient complains of anxiety.    Interval History: Patient seen & interviewed, last seen about 2 months ago. Social anxiety symptoms ongoing, though Less tense, suspicious, worrying.   Appetite and social withdrawal symptoms. Sleeping med not working well. No new health problems. No new meds. Same circumstances and routines.   No side effects. Adherence is good. Initiation nausea - then got used to it.     Background: Pt is a 24 y/o F who presents for establishment of care, referred by Dr. Kennedy, seen for psychotherapy by Javier Cardoso starting in July. Reports anxiety symptoms back to middle school, probably 7th grade, starting with social anxiety, worrying about others' judgement. Symptoms have gotten worse, more pervasive, and with concern for personal safety, meta-anxiety about anxiety, avoidance. Denies history of jovani trauma. Says that she "doesn't go anywhere anymore", dropped out of [employer] due to anxiety. has lost or quit multiple jobs due to symptoms/ attendance. Understands nature of her fear is exaggerated. Has taken xanax (remote) from Dr. Laron Schreiber about 5 days/week. Was prescribed wellbutrin by Dr. Kennedy, but just managed two doses before self-discontinuing due to intolerable side effects (tingling).      Started therapy last year. Interrupted by insurance loss, then back. Thinks therapy helps. Xanax helps when she has a panic attack. Sometimes able to abort prior to attacks by deep breathing and self-talk. Depression since about age 16 after a bad relationship.     Past meds: buprop - side effects.   Sertraline - more depressed.     FamHx: mom, dad, sister - anxiety. "but I'm the worst".     Psych Hx: as above. Attempts at work have been unsuccessful due to "doubt myself", panic attacks, avoidance.     Social Hx: see below. Lives " "with boyfriend and his brother and sister-in-law. Supportive relationship. No maltreatment. Good relationship with mom, 2 brothers, "so-so with sister", not much with dad (who has been quite critical; dad also doesn't approve of her bf who is black. Dad is also an alcoholic).     History of present illness: 22 year old  female presented for initial evaluation, referred by Hilda Kennedy MD, in Internal Medicine, with chief complaint of chronic generalized anxiety, sleep deficits, frequent tearfulness, 40 lbs weight gain over approximately the past year. Reporting difficulty following through to complete tasks but considers it laziness & procrastination, not particularly focus difficulty. Endorsed difficulty with motivation with many tasks, as long as she can recall. Hated school both socially & academically. Pt described distinct family history of anxiety in both parents & sister. Pt stated she dropped out of the 11th grade secondary to anxiety & quickly obtained her GED.  Reported stress in family from other's alcohol use problems & his negativity & intrusiveness when under the influence. Pt said she started to distance herself as a teen.  Reported also a lot of racial tension as patient has dated inter-racially & father angrily disapproved and been insulting. She has been in her current committed relationship the past 5 years, & her boyfriend is . Pt said she has been unable to maintain employment due to her anxiety, bailing out of jobs as a result of insecurity on new jobs. She said she is supported by her mother & boyfriend. Semi-active [employer] but will watch services on TV rather than face people live in [employer] services. Pt denied any si/hi, psychosis, cognitive deficit, rages, mood swings, or substance use problems. Identified therapeutic goals include reducing anxiety & improving coping skills for anxiety and interpersonal conflict.      Symptoms:   ? Mood: tearfulness and poor " motivation  ? Anxiety: excessive anxiety/worry and social phobia, decreased sleep  ? Substance abuse: denied  ? Cognitive functioning: denied  ? Health behaviors: noncontributory     Psychiatric history: psychotropic management by PCP     Medical history: 40 pounds weight gain in the past year; currently moderately obese; kidney stones; denied any other physical/medical conditions     Family history of psychiatric illness: Mother, father, and sister all markedly anxious; father also with chronic alcohol use problems. Paternal grandfather also reported to be alcoholic.     Social history: Grew up in [place], the youngest of 4 siblings; has two brothers & a sister. Raised by both parents who remain together. Pt reported father's alcohol abuse led to many conflicts & tensions in the family home; patient feeling undermined, judged, not at all affirmed by him. She reported hating school, due in significant part to her anxiety. Reports chronic fear of judgment and inadequacy; impulse is to avoid social situations. Described father as having always been negative & judgmental as long as she can remember. Said father's mother was also the same way, very negative, prejudiced, & controlling. Pt very aware of racist/racially intollerant attitudes in her father & paternal grandparents. Reports she dropped out of school & promptly obtained her GED at age 15. Reported 2 committed relationships, the first one ending at age 17; said it was a bad relationship. Has been with current boyfriend the past 5 years; describing him as supportive & understanding. Reported both relationships have been interracial, evoking much conflict with her father & his side of the family. She has just avoided him as much as possible. Pt unemployed, feeling unable to tolerate the social aspect of work settings. Reports being financially supported by boyfriend & her mother. No children; never . No  experience.       Substance use:  Alcohol: infrequent   Drugs: sporadic marijuana use from age 13 to current; last reported use August 17th.   Tobacco: former smoker age 13 to 18   Caffeine: none    Review Of Systems:     GENERAL:  No weight gain or loss  SKIN:  No rashes or lacerations  HEAD:  No headaches  EYES:  No exophthalmos, jaundice or blindness  EARS:  No dizziness, tinnitus or hearing loss  NOSE:  No changes in smell  MOUTH & THROAT:  No dyskinetic movements or obvious goiter  CHEST:  No shortness of breath, hyperventilation or cough  CARDIOVASCULAR:  No tachycardia or chest pain  ABDOMEN:  No nausea, vomiting, pain, constipation or diarrhea  URINARY:  No frequency, dysuria or sexual dysfunction  ENDOCRINE:  No polydipsia, polyuria  MUSCULOSKELETAL:  No pain or stiffness of the joints  NEUROLOGIC:  No weakness, sensory changes, seizures, confusion, memory loss, tremor or other abnormal movements    Current Evaluation:     Nutritional Screening: Considering the patient's height and weight, medications, medical history and preferences, should a referral be made to the dietitian? no    Constitutional  Vitals:  Most recent vital signs, dated less than 90 days prior to this appointment, were not reviewed.    There were no vitals filed for this visit.     General:  unremarkable, age appropriate     Musculoskeletal  Muscle Strength/Tone:  no tremor, no tic   Gait & Station:  non-ataxic     Psychiatric  Appearance: casually dressed & groomed;   Behavior: calm,   Cooperation: cooperative with assessment  Speech: normal rate, volume, tone  Thought Process: linear, goal-directed  Thought Content: No suicidal or homicidal ideation; no delusions  Affect: anxious  Mood: anxious  Perceptions: No auditory or visual hallucinations  Level of Consciousness: alert throughout interview  Insight: fair  Cognition: Oriented to person, place, time, & situation  Memory: no apparent deficits to general clinical interview; not formally  assessed  Attention/Concentration: no apparent deficits to general clinical interview; not formally assessed  Fund of Knowledge: average by vocabulary/education    Laboratory Data  No visits with results within 1 Month(s) from this visit.   Latest known visit with results is:   Lab Visit on 09/22/2017   Component Date Value Ref Range Status    Specimen UA 09/22/2017 Urine, Clean Catch   Final    Color, UA 09/22/2017 Yellow  Yellow, Straw, Julia Final    Appearance, UA 09/22/2017 Clear  Clear Final    pH, UA 09/22/2017 6.0  5.0 - 8.0 Final    Specific Gravity, UA 09/22/2017 1.015  1.005 - 1.030 Final    Protein, UA 09/22/2017 Negative  Negative Final    Glucose, UA 09/22/2017 Negative  Negative Final    Ketones, UA 09/22/2017 Negative  Negative Final    Bilirubin (UA) 09/22/2017 Negative  Negative Final    Occult Blood UA 09/22/2017 1+* Negative Final    Nitrite, UA 09/22/2017 Negative  Negative Final    Leukocytes, UA 09/22/2017 Negative  Negative Final    RBC, UA 09/22/2017 4  0 - 4 /hpf Final    Microscopic Comment 09/22/2017 SEE COMMENT   Final     Medications  Outpatient Encounter Medications as of 4/4/2019   Medication Sig Dispense Refill    alprazolam (XANAX) 0.5 MG tablet Take 0.5 mg by mouth 2 (two) times daily as needed.  4    buPROPion (WELLBUTRIN) 100 MG tablet Take 1 tablet (100 mg total) by mouth once daily. 30 tablet 1    DULoxetine (CYMBALTA) 30 MG capsule Take 1 capsule (30 mg total) by mouth once daily. 90 capsule 2    DULoxetine (CYMBALTA) 60 MG capsule Take 1 capsule (60 mg total) by mouth once daily. 30 capsule 2    SPRINTEC, 28, 0.25-35 mg-mcg per tablet Take 1 tablet by mouth once daily.  11    traZODone (DESYREL) 100 MG tablet Take 1/2 to 1 tablet at bedtime as needed for sleep. 30 tablet 2    zolpidem (AMBIEN) 5 MG Tab Take 1/2 to 1 tablet at bedtime as needed for sleep 30 tablet 2     No facility-administered encounter medications on file as of 4/4/2019.      Assessment  - Diagnosis - Goals:     Impression: 23 y/o F with severe and pervasive anxiety, dysfunction in multiple life roles including occupational dysfunction. Has had limited previous treatment, modest benefits from xanax and psychotherapy. Some benefits from duloxetine. Ongoing sleep symptoms.     Dx: Generalized anxiety disorder, insomnia    Treatment Goals:  Specify outcomes written in observable, behavioral terms: reduce symptoms by self-reports and scales. Improve functioning.     Treatment Plan/Recommendations:   · duloxetine to 90 mg daily for mood.  · Continue psychotherapy.   · Discussed risks, benefits, and alternatives to treatment plan documented above with patient. I answered all patient questions related to this plan and patient expressed understanding and agreement.     Return to Clinic: 2 months    Counseling time: 10 minutes  Total time: 25 minutes    ELAINE Ga MD  Psychiatry  [facility]  [address] , [place], [place] [id number] [phone]

## 2019-04-04 ENCOUNTER — OFFICE VISIT (OUTPATIENT)
Dept: PSYCHIATRY | Facility: CLINIC | Age: 24
End: 2019-04-04
Payer: MEDICAID

## 2019-04-04 DIAGNOSIS — F41.1 GAD (GENERALIZED ANXIETY DISORDER): Primary | ICD-10-CM

## 2019-04-04 DIAGNOSIS — G47.00 INSOMNIA, UNSPECIFIED TYPE: ICD-10-CM

## 2019-04-04 PROCEDURE — 99214 PR OFFICE/OUTPT VISIT, EST, LEVL IV, 30-39 MIN: ICD-10-PCS | Mod: S$PBB,AF,HB, | Performed by: PSYCHIATRY & NEUROLOGY

## 2019-04-04 PROCEDURE — 99999 PR PBB SHADOW E&M-EST. PATIENT-LVL I: ICD-10-PCS | Mod: PBBFAC,,, | Performed by: PSYCHIATRY & NEUROLOGY

## 2019-04-04 PROCEDURE — 99211 OFF/OP EST MAY X REQ PHY/QHP: CPT | Mod: PBBFAC,PN | Performed by: PSYCHIATRY & NEUROLOGY

## 2019-04-04 PROCEDURE — 99214 OFFICE O/P EST MOD 30 MIN: CPT | Mod: S$PBB,AF,HB, | Performed by: PSYCHIATRY & NEUROLOGY

## 2019-04-04 PROCEDURE — 99999 PR PBB SHADOW E&M-EST. PATIENT-LVL I: CPT | Mod: PBBFAC,,, | Performed by: PSYCHIATRY & NEUROLOGY

## 2019-04-04 RX ORDER — DULOXETIN HYDROCHLORIDE 30 MG/1
30 CAPSULE, DELAYED RELEASE ORAL DAILY
Qty: 90 CAPSULE | Refills: 1 | Status: SHIPPED | OUTPATIENT
Start: 2019-04-04 | End: 2019-07-29 | Stop reason: ALTCHOICE

## 2019-04-04 RX ORDER — DULOXETIN HYDROCHLORIDE 60 MG/1
60 CAPSULE, DELAYED RELEASE ORAL DAILY
Qty: 90 CAPSULE | Refills: 1 | Status: SHIPPED | OUTPATIENT
Start: 2019-04-04 | End: 2019-07-29 | Stop reason: ALTCHOICE

## 2019-07-29 ENCOUNTER — OFFICE VISIT (OUTPATIENT)
Dept: PSYCHIATRY | Facility: CLINIC | Age: 24
End: 2019-07-29
Payer: MEDICAID

## 2019-07-29 VITALS
SYSTOLIC BLOOD PRESSURE: 116 MMHG | WEIGHT: 206.81 LBS | HEART RATE: 110 BPM | BODY MASS INDEX: 32.39 KG/M2 | DIASTOLIC BLOOD PRESSURE: 90 MMHG

## 2019-07-29 DIAGNOSIS — F41.1 GAD (GENERALIZED ANXIETY DISORDER): Primary | ICD-10-CM

## 2019-07-29 PROCEDURE — 99214 PR OFFICE/OUTPT VISIT, EST, LEVL IV, 30-39 MIN: ICD-10-PCS | Mod: AF,HB,S$PBB, | Performed by: PSYCHIATRY & NEUROLOGY

## 2019-07-29 PROCEDURE — 99214 OFFICE O/P EST MOD 30 MIN: CPT | Mod: AF,HB,S$PBB, | Performed by: PSYCHIATRY & NEUROLOGY

## 2019-07-29 PROCEDURE — 99212 OFFICE O/P EST SF 10 MIN: CPT | Mod: PBBFAC | Performed by: PSYCHIATRY & NEUROLOGY

## 2019-07-29 PROCEDURE — 99999 PR PBB SHADOW E&M-EST. PATIENT-LVL II: ICD-10-PCS | Mod: PBBFAC,,, | Performed by: PSYCHIATRY & NEUROLOGY

## 2019-07-29 PROCEDURE — 99999 PR PBB SHADOW E&M-EST. PATIENT-LVL II: CPT | Mod: PBBFAC,,, | Performed by: PSYCHIATRY & NEUROLOGY

## 2019-07-29 RX ORDER — ESCITALOPRAM OXALATE 10 MG/1
10 TABLET ORAL DAILY
Qty: 30 TABLET | Refills: 2 | Status: SHIPPED | OUTPATIENT
Start: 2019-07-29 | End: 2019-09-27 | Stop reason: DRUGHIGH

## 2019-07-29 NOTE — PROGRESS NOTES
"Outpatient Psychiatry Follow-up Visit (MD/NP)    7/29/2019    Cony Lora, a 24 y.o. female, presenting for follow-up visit. Met with patient.    Reason for Encounter: Patient complains of anxiety.    Interval History: Patient seen & interviewed, last seen about 3 months ago. Reports that in May and June "was pretty down". Started to feel better in the middle of that month then BF lost his job and started to worry about finances. Tense. But anxiety not quite as bad. Less avoidance. In spite of more stress. Up & down.   Sleep is better. Clenching her jaw in her sleep per her dentist. spironolcatone - helping acne. Started around the same time as the last appointment here. Depression worse overall than previously. Health generally is ok.   Reports that she's been adherent with medication.   Had a initiation/transition side effect.     Trials -   Sertraline    Background: Pt is a 22 y/o F who presents for establishment of care, referred by Dr. Kennedy, seen for psychotherapy by Javier Cardoso starting in July. Reports anxiety symptoms back to middle school, probably 7th grade, starting with social anxiety, worrying about others' judgement. Symptoms have gotten worse, more pervasive, & with concern for personal safety, meta-anxiety about anxiety, avoidance. Denies history of jovani trauma. Says that she "doesn't go anywhere anymore", dropped out of  due to anxiety. has lost or quit multiple jobs due to symptoms/ attendance. Understands nature of her fear is exaggerated. Has taken xanax (remote) from Dr. Laron Schreiber about 5 days/week. Was prescribed wellbutrin by Dr. Kennedy, but just managed two doses before self-discontinuing due to intolerable side effects (tingling).      Started therapy last year. Interrupted by insurance loss, then back. Thinks therapy helps. Xanax helps when she has a panic attack. Sometimes able to abort prior to attacks by deep breathing and self-talk. Depression since about age 16 after a " "bad relationship.     Past meds: buprop - side effects.   Sertraline - more depressed.     FamHx: mom, dad, sister - anxiety. "but I'm the worst".     Psych Hx: as above. Attempts at work have been unsuccessful due to "doubt myself", panic attacks, avoidance.     Social Hx: see below. Lives with boyfriend and his brother and sister-in-law. Supportive relationship. No maltreatment. Good relationship with mom, 2 brothers, "so-so with sister", not much with dad (who has been quite critical; dad also doesn't approve of her bf who is black. Dad is also an alcoholic).     History of present illness: 22 year old  female presented for initial evaluation, referred by Hilda Kennedy MD, in Internal Medicine, with chief complaint of chronic generalized anxiety, sleep deficits, frequent tearfulness, 40 lbs weight gain over approximately the past year. Reporting difficulty following through to complete tasks but considers it laziness & procrastination, not particularly focus difficulty. Endorsed difficulty with motivation with many tasks, as long as she can recall. Hated school both socially & academically. Pt described distinct family history of anxiety in both parents & sister. Pt stated she dropped out of the 11th grade secondary to anxiety & quickly obtained her GED.  Reported stress in family from other's alcohol use problems & his negativity & intrusiveness when under the influence. Pt said she started to distance herself as a teen.  Reported also a lot of racial tension as patient has dated inter-racially & father angrily disapproved and been insulting. She has been in her current committed relationship the past 5 years, & her boyfriend is . Pt said she has been unable to maintain employment due to her anxiety, bailing out of jobs as a result of insecurity on new jobs. She said she is supported by her mother & boyfriend. Semi-active Mandaeism but will watch services on TV rather than face people " live in [employer]. Pt denied any si/hi, psychosis, cognitive deficit, rages, mood swings, or substance use problems. Identified therapeutic goals include reducing anxiety & improving coping skills for anxiety and interpersonal conflict.      Symptoms:   ? Mood: tearfulness and poor motivation  ? Anxiety: excessive anxiety/worry and social phobia, decreased sleep  ? Substance abuse: denied  ? Cognitive functioning: denied  ? Health behaviors: noncontributory     Psychiatric history: psychotropic management by PCP     Medical history: 40 pounds weight gain in the past year; currently moderately obese; kidney stones; denied any other physical/medical conditions     Family history of psychiatric illness: Mother, father, and sister all markedly anxious; father also with chronic alcohol use problems. Paternal grandfather also reported to be alcoholic.     Social history: Grew up in [place], the youngest of 4 siblings; has two brothers & a sister. Raised by both parents who remain together. Pt reported father's alcohol abuse led to many conflicts & tensions in the family home; patient feeling undermined, judged, not at all affirmed by him. She reported hating school, due in significant part to her anxiety. Reports chronic fear of judgment and inadequacy; impulse is to avoid social situations. Described father as having always been negative & judgmental as long as she can remember. Said father's mother was also the same way, very negative, prejudiced, & controlling. Pt very aware of racist/racially intollerant attitudes in her father & paternal grandparents. Reports she dropped out of school & promptly obtained her GED at age 15. Reported 2 committed relationships, the first one ending at age 17; said it was a bad relationship. Has been with current boyfriend the past 5 years; describing him as supportive & understanding  Reported both relationships have been interracial, evoking much conflict with her father &  his side of the family. She has just avoided him as much as possible. Pt unemployed, feeling unable to tolerate the social aspect of work settings. Reports being financially supported by boyfriend & her mother. No children; never . No  experience.       Substance use: Alcohol: infrequent   Drugs: sporadic marijuana use from age 13 to current; last reported use August 17th.   Tobacco: former smoker age 13 to 18   Caffeine: none    Review Of Systems:     GENERAL:  No weight gain or loss  SKIN:  No rashes or lacerations  HEAD:  No headaches  EYES:  No exophthalmos, jaundice or blindness  EARS:  No dizziness, tinnitus or hearing loss  NOSE:  No changes in smell  MOUTH & THROAT:  No dyskinetic movements or obvious goiter  CHEST:  No shortness of breath, hyperventilation or cough  CARDIOVASCULAR:  No tachycardia or chest pain  ABDOMEN:  No nausea, vomiting, pain, constipation or diarrhea  URINARY:  No frequency, dysuria or sexual dysfunction  ENDOCRINE:  No polydipsia, polyuria  MUSCULOSKELETAL:  No pain or stiffness of the joints  NEUROLOGIC:  No weakness, sensory changes, seizures, confusion, memory loss, tremor or other abnormal movements    Current Evaluation:     Nutritional Screening: Considering the patient's height and weight, medications, medical history and preferences, should a referral be made to the dietitian? no    Constitutional  Vitals:  Most recent vital signs, dated less than 90 days prior to this appointment, were not reviewed.    Vitals:    07/29/19 0838   BP: (!) 116/90   Pulse: 110   Weight: 93.8 kg (206 lb 12.7 oz)        General:  unremarkable, age appropriate     Musculoskeletal  Muscle Strength/Tone:  no tremor, no tic   Gait & Station:  non-ataxic     Psychiatric  Appearance: casually dressed & groomed;   Behavior: calm,   Cooperation: cooperative with assessment  Speech: normal rate, volume, tone  Thought Process: linear, goal-directed  Thought Content: No suicidal or homicidal  ideation; no delusions  Affect: anxious  Mood: anxious  Perceptions: No auditory or visual hallucinations  Level of Consciousness: alert throughout interview  Insight: fair  Cognition: Oriented to person, place, time, & situation  Memory: no apparent deficits to general clinical interview; not formally assessed  Attention/Concentration: no apparent deficits to general clinical interview; not formally assessed  Fund of Knowledge: average by vocabulary/education    Laboratory Data  No visits with results within 1 Month(s) from this visit.   Latest known visit with results is:   Lab Visit on 09/22/2017   Component Date Value Ref Range Status    Specimen UA 09/22/2017 Urine, Clean Catch   Final    Color, UA 09/22/2017 Yellow  Yellow, Straw, Julia Final    Appearance, UA 09/22/2017 Clear  Clear Final    pH, UA 09/22/2017 6.0  5.0 - 8.0 Final    Specific Gravity, UA 09/22/2017 1.015  1.005 - 1.030 Final    Protein, UA 09/22/2017 Negative  Negative Final    Glucose, UA 09/22/2017 Negative  Negative Final    Ketones, UA 09/22/2017 Negative  Negative Final    Bilirubin (UA) 09/22/2017 Negative  Negative Final    Occult Blood UA 09/22/2017 1+* Negative Final    Nitrite, UA 09/22/2017 Negative  Negative Final    Leukocytes, UA 09/22/2017 Negative  Negative Final    RBC, UA 09/22/2017 4  0 - 4 /hpf Final    Microscopic Comment 09/22/2017 SEE COMMENT   Final     Medications  Outpatient Encounter Medications as of 7/29/2019   Medication Sig Dispense Refill    DULoxetine (CYMBALTA) 30 MG capsule Take 1 capsule (30 mg total) by mouth once daily. 90 capsule 1    DULoxetine (CYMBALTA) 60 MG capsule Take 1 capsule (60 mg total) by mouth once daily. 90 capsule 1    SPRINTEC, 28, 0.25-35 mg-mcg per tablet Take 1 tablet by mouth once daily.  11    traZODone (DESYREL) 100 MG tablet Take 1/2 to 1 tablet at bedtime as needed for sleep. 30 tablet 2    zolpidem (AMBIEN) 5 MG Tab Take 1/2 to 1 tablet at bedtime as needed for  sleep 30 tablet 2     No facility-administered encounter medications on file as of 7/29/2019.      Assessment - Diagnosis - Goals:     Impression: 23 y/o F with hx of severe and pervasive anxiety, dysfunction in multiple life roles including occupational dysfunction. Has had limited previous treatment, modest benefits from xanax and psychotherapy. Some benefits from duloxetine, but ongoing symptoms, no better response at higher doses. Ongoing sleep symptoms.     Dx: Generalized anxiety disorder, insomnia    Treatment Goals:  Specify outcomes written in observable, behavioral terms: reduce symptoms by self-reports and scales. Improve functioning.     Treatment Plan/Recommendations:   · escitalopram trial in place of duloxetine.  · Continue psychotherapy.   · Discussed risks, benefits, and alternatives to treatment plan documented above with patient. I answered all patient questions related to this plan and patient expressed understanding and agreement.     Return to Clinic: 2 months    Counseling time: 10 minutes  Total time: 25 minutes    ELAINE Ga MD  Psychiatry  [facility]  [address] ,  [address]  [phone]

## 2019-08-29 ENCOUNTER — PATIENT MESSAGE (OUTPATIENT)
Dept: PSYCHIATRY | Facility: CLINIC | Age: 24
End: 2019-08-29

## 2019-09-02 NOTE — PROGRESS NOTES
"Outpatient Psychiatry Follow-up Visit (MD/NP)    9/3/2019    Cony Lora, a 24 y.o. female, presenting for follow-up visit. Met with patient.    Reason for Encounter: Patient complains of anxiety.    Interval History: Patient seen & interviewed, last seen about 1 month ago. Endorses some unprovoked panic attacks. Irritable. Low interest. Gaining some weightNightmares. Poor sleep both with/without ambien. Multiple breaks. Health generally ok. No new dx'es or medications. Adherent to medication. No new stressors. Same as previously.     Trials -   Sertraline  Duloxetine  Escitalopram.     Background: Pt is a 24 y/o F who presents for establishment of care, referred by Dr. Kennedy, seen for psychotherapy by Javier Cardoso starting in July. Reports anxiety symptoms back to middle school, probably 7th grade, starting with social anxiety, worrying about others' judgement. Symptoms have gotten worse, more pervasive, & with concern for personal safety, meta-anxiety about anxiety, avoidance. Denies history of jovani trauma. Says that she "doesn't go anywhere anymore", dropped out of  due to anxiety. has lost or quit multiple jobs due to symptoms/ attendance. Understands nature of her fear is exaggerated. Has taken xanax (remote) from Dr. Laron Schreiber about 5 days/week. Was prescribed wellbutrin by Dr. Kennedy, but just managed two doses before self-discontinuing due to intolerable side effects (tingling).      Started therapy last year. Interrupted by insurance loss, then back. Thinks therapy helps. Xanax helps when she has a panic attack. Sometimes able to abort prior to attacks by deep breathing and self-talk. Depression since about age 16 after a bad relationship.     Past meds: buprop - side effects.   Sertraline - more depressed.     FamHx: mom, dad, sister - anxiety. "but I'm the worst".     Psych Hx: as above. Attempts at work have been unsuccessful due to "doubt myself", panic attacks, avoidance.     Social Hx: see " "below. Lives with boyfriend and his brother and sister-in-law. Supportive relationship. No maltreatment. Good relationship with mom, 2 brothers, "so-so with sister", not much with dad (who has been quite critical; dad also doesn't approve of her bf who is black. Dad is also an alcoholic).     History of present illness: 22 year old  female presented for initial evaluation, referred by Hilda Kennedy MD, in Internal Medicine, with chief complaint of chronic generalized anxiety, sleep deficits, frequent tearfulness, 40 lbs weight gain over approximately the past year. Reporting difficulty following through to complete tasks but considers it laziness & procrastination, not particularly focus difficulty. Endorsed difficulty with motivation with many tasks, as long as she can recall. Hated school both socially & academically. Pt described distinct family history of anxiety in both parents & sister. Pt stated she dropped out of the 11th grade secondary to anxiety & quickly obtained her GED.  Reported stress in family from other's alcohol use problems & his negativity & intrusiveness when under the influence. Pt said she started to distance herself as a teen.  Reported also a lot of racial tension as patient has dated inter-racially & father angrily disapproved and been insulting. She has been in her current committed relationship the past 5 years, & her boyfriend is . Pt said she has been unable to maintain employment due to her anxiety, bailing out of jobs as a result of insecurity on new jobs. She said she is supported by her mother & boyfriend. Semi-active Yazidi but will watch services on TV rather than face people live in [employer] services. Pt denied any si/hi, psychosis, cognitive deficit, rages, mood swings, or substance use problems. Identified therapeutic goals include reducing anxiety & improving coping skills for anxiety and interpersonal conflict.      Symptoms:   ? Mood: tearfulness " and poor motivation  ? Anxiety: excessive anxiety/worry and social phobia, decreased sleep  ? Substance abuse: denied  ? Cognitive functioning: denied  ? Health behaviors: noncontributory     Psychiatric history: psychotropic management by PCP     Medical history: 40 pounds weight gain in the past year; currently moderately obese; kidney stones; denied any other physical/medical conditions     Family history of psychiatric illness: Mother, father, and sister all markedly anxious; father also with chronic alcohol use problems. Paternal grandfather also reported to be alcoholic.     Social history: Grew up in [place], the youngest of 4 siblings; has two brothers & a sister. Raised by both parents who remain together. Pt reported father's alcohol abuse led to many conflicts & tensions in the family home; patient feeling undermined, judged, not at all affirmed by him. She reported hating school, due in significant part to her anxiety. Reports chronic fear of judgment and inadequacy; impulse is to avoid social situations. Described father as having always been negative & judgmental as long as she can remember. Said father's mother was also the same way, very negative, prejudiced, & controlling. Pt very aware of racist/racially intollerant attitudes in her father & paternal grandparents. Reports she dropped out of school & promptly obtained her GED at age 15. Reported 2 committed relationships, the first one ending at age 17; said it was a bad relationship. Has been with current boyfriend the past 5 years; describing him as supportive & understanding  Reported both relationships have been interracial, evoking much conflict with her father & his side of the family. She has just avoided him as much as possible. Pt unemployed, feeling unable to tolerate the social aspect of work settings. Reports being financially supported by boyfriend & her mother. No children; never . No  experience.       Substance  use: Alcohol: infrequent   Drugs: sporadic marijuana use from age 13 to current; last reported use August 17th.   Tobacco: former smoker age 13 to 18   Caffeine: none    Review Of Systems:     GENERAL:  No weight gain or loss  SKIN:  No rashes or lacerations  HEAD:  No headaches  EYES:  No exophthalmos, jaundice or blindness  EARS:  No dizziness, tinnitus or hearing loss  NOSE:  No changes in smell  MOUTH & THROAT:  No dyskinetic movements or obvious goiter  CHEST:  No shortness of breath, hyperventilation or cough  CARDIOVASCULAR:  No tachycardia or chest pain  ABDOMEN:  No nausea, vomiting, pain, constipation or diarrhea  URINARY:  No frequency, dysuria or sexual dysfunction  ENDOCRINE:  No polydipsia, polyuria  MUSCULOSKELETAL:  No pain or stiffness of the joints  NEUROLOGIC:  No weakness, sensory changes, seizures, confusion, memory loss, tremor or other abnormal movements    Current Evaluation:     Nutritional Screening: Considering the patient's height and weight, medications, medical history and preferences, should a referral be made to the dietitian? no    Constitutional  Vitals:  Most recent vital signs, dated less than 90 days prior to this appointment, were not reviewed.    There were no vitals filed for this visit.     General:  unremarkable, age appropriate     Musculoskeletal  Muscle Strength/Tone:  no tremor, no tic   Gait & Station:  non-ataxic     Psychiatric  Appearance: casually dressed & groomed;   Behavior: calm,   Cooperation: cooperative with assessment  Speech: normal rate, volume, tone  Thought Process: linear, goal-directed  Thought Content: No suicidal or homicidal ideation; no delusions  Affect: anxious  Mood: anxious  Perceptions: No auditory or visual hallucinations  Level of Consciousness: alert throughout interview  Insight: fair  Cognition: Oriented to person, place, time, & situation  Memory: no apparent deficits to general clinical interview; not formally  assessed  Attention/Concentration: no apparent deficits to general clinical interview; not formally assessed  Fund of Knowledge: average by vocabulary/education    Laboratory Data  No visits with results within 1 Month(s) from this visit.   Latest known visit with results is:   Lab Visit on 09/22/2017   Component Date Value Ref Range Status    Specimen UA 09/22/2017 Urine, Clean Catch   Final    Color, UA 09/22/2017 Yellow  Yellow, Straw, Julia Final    Appearance, UA 09/22/2017 Clear  Clear Final    pH, UA 09/22/2017 6.0  5.0 - 8.0 Final    Specific Gravity, UA 09/22/2017 1.015  1.005 - 1.030 Final    Protein, UA 09/22/2017 Negative  Negative Final    Glucose, UA 09/22/2017 Negative  Negative Final    Ketones, UA 09/22/2017 Negative  Negative Final    Bilirubin (UA) 09/22/2017 Negative  Negative Final    Occult Blood UA 09/22/2017 1+* Negative Final    Nitrite, UA 09/22/2017 Negative  Negative Final    Leukocytes, UA 09/22/2017 Negative  Negative Final    RBC, UA 09/22/2017 4  0 - 4 /hpf Final    Microscopic Comment 09/22/2017 SEE COMMENT   Final     Medications  Outpatient Encounter Medications as of 9/3/2019   Medication Sig Dispense Refill    escitalopram oxalate (LEXAPRO) 10 MG tablet Take 1 tablet (10 mg total) by mouth once daily. 30 tablet 2    SPRINTEC, 28, 0.25-35 mg-mcg per tablet Take 1 tablet by mouth once daily.  11     No facility-administered encounter medications on file as of 9/3/2019.      Assessment - Diagnosis - Goals:     Impression: 25 y/o F with hx of severe and pervasive anxiety, dysfunction in multiple life roles including occupational dysfunction. Has had limited previous treatment, modest benefits from xanax and psychotherapy. Some benefits from duloxetine, but ongoing symptoms, no better response at higher doses. Insufficient response to escitalopram. Ongoing sleep symptoms.     Dx: Generalized anxiety disorder, insomnia    Treatment Goals:  Specify outcomes written in  observable, behavioral terms: reduce symptoms by self-reports and scales. Improve functioning.     Treatment Plan/Recommendations:   · Buspirone trial for anxiety, temazepam prn sleep.  · Discussed risks, benefits, and alternatives to treatment plan documented above with patient. I answered all patient questions related to this plan and patient expressed understanding and agreement.     Return to Clinic: 2 months    Counseling time: 10 minutes  Total time: 25 minutes    ELAINE Ga MD  Psychiatry  [facility]  [address] , [place], [place] [id number] [phone]

## 2019-09-03 ENCOUNTER — OFFICE VISIT (OUTPATIENT)
Dept: PSYCHIATRY | Facility: CLINIC | Age: 24
End: 2019-09-03
Payer: MEDICAID

## 2019-09-03 VITALS
BODY MASS INDEX: 33.49 KG/M2 | WEIGHT: 213.88 LBS | DIASTOLIC BLOOD PRESSURE: 64 MMHG | HEART RATE: 83 BPM | SYSTOLIC BLOOD PRESSURE: 105 MMHG

## 2019-09-03 DIAGNOSIS — F41.1 GAD (GENERALIZED ANXIETY DISORDER): Primary | ICD-10-CM

## 2019-09-03 DIAGNOSIS — Z63.9 FAMILY DYNAMICS PROBLEM: ICD-10-CM

## 2019-09-03 PROCEDURE — 99999 PR PBB SHADOW E&M-EST. PATIENT-LVL II: ICD-10-PCS | Mod: PBBFAC,,, | Performed by: PSYCHIATRY & NEUROLOGY

## 2019-09-03 PROCEDURE — 99999 PR PBB SHADOW E&M-EST. PATIENT-LVL I: CPT | Mod: PBBFAC,,, | Performed by: SOCIAL WORKER

## 2019-09-03 PROCEDURE — 99999 PR PBB SHADOW E&M-EST. PATIENT-LVL II: CPT | Mod: PBBFAC,,, | Performed by: PSYCHIATRY & NEUROLOGY

## 2019-09-03 PROCEDURE — 99211 OFF/OP EST MAY X REQ PHY/QHP: CPT | Mod: PBBFAC,27 | Performed by: SOCIAL WORKER

## 2019-09-03 PROCEDURE — 90834 PR PSYCHOTHERAPY W/PATIENT, 45 MIN: ICD-10-PCS | Mod: HB,AJ,, | Performed by: SOCIAL WORKER

## 2019-09-03 PROCEDURE — 90834 PSYTX W PT 45 MINUTES: CPT | Mod: HB,AJ,, | Performed by: SOCIAL WORKER

## 2019-09-03 PROCEDURE — 99999 PR PBB SHADOW E&M-EST. PATIENT-LVL I: ICD-10-PCS | Mod: PBBFAC,,, | Performed by: SOCIAL WORKER

## 2019-09-03 PROCEDURE — 99214 PR OFFICE/OUTPT VISIT, EST, LEVL IV, 30-39 MIN: ICD-10-PCS | Mod: S$PBB,AF,HB, | Performed by: PSYCHIATRY & NEUROLOGY

## 2019-09-03 PROCEDURE — 99212 OFFICE O/P EST SF 10 MIN: CPT | Mod: PBBFAC | Performed by: PSYCHIATRY & NEUROLOGY

## 2019-09-03 PROCEDURE — 99214 OFFICE O/P EST MOD 30 MIN: CPT | Mod: S$PBB,AF,HB, | Performed by: PSYCHIATRY & NEUROLOGY

## 2019-09-03 SDOH — SOCIAL DETERMINANTS OF HEALTH (SDOH): PROBLEM RELATED TO PRIMARY SUPPORT GROUP, UNSPECIFIED: Z63.9

## 2019-09-04 RX ORDER — BUSPIRONE HYDROCHLORIDE 30 MG/1
30 TABLET ORAL 2 TIMES DAILY
Qty: 60 TABLET | Refills: 2 | Status: SHIPPED | OUTPATIENT
Start: 2019-09-04 | End: 2019-09-27 | Stop reason: SINTOL

## 2019-09-04 RX ORDER — TEMAZEPAM 15 MG/1
15 CAPSULE ORAL NIGHTLY PRN
Qty: 30 CAPSULE | Refills: 2 | Status: SHIPPED | OUTPATIENT
Start: 2019-09-04 | End: 2019-10-04

## 2019-09-17 ENCOUNTER — PATIENT MESSAGE (OUTPATIENT)
Dept: PSYCHIATRY | Facility: CLINIC | Age: 24
End: 2019-09-17

## 2019-09-18 ENCOUNTER — OFFICE VISIT (OUTPATIENT)
Dept: PSYCHIATRY | Facility: CLINIC | Age: 24
End: 2019-09-18
Payer: MEDICAID

## 2019-09-18 DIAGNOSIS — Z63.9 FAMILY DYNAMICS PROBLEM: ICD-10-CM

## 2019-09-18 DIAGNOSIS — F41.1 GAD (GENERALIZED ANXIETY DISORDER): Primary | ICD-10-CM

## 2019-09-18 PROCEDURE — 99211 OFF/OP EST MAY X REQ PHY/QHP: CPT | Mod: PBBFAC | Performed by: SOCIAL WORKER

## 2019-09-18 PROCEDURE — 99999 PR PBB SHADOW E&M-EST. PATIENT-LVL I: CPT | Mod: PBBFAC,,, | Performed by: SOCIAL WORKER

## 2019-09-18 PROCEDURE — 99999 PR PBB SHADOW E&M-EST. PATIENT-LVL I: ICD-10-PCS | Mod: PBBFAC,,, | Performed by: SOCIAL WORKER

## 2019-09-18 PROCEDURE — 90834 PSYTX W PT 45 MINUTES: CPT | Mod: AJ,HB,, | Performed by: SOCIAL WORKER

## 2019-09-18 PROCEDURE — 90834 PR PSYCHOTHERAPY W/PATIENT, 45 MIN: ICD-10-PCS | Mod: AJ,HB,, | Performed by: SOCIAL WORKER

## 2019-09-18 SDOH — SOCIAL DETERMINANTS OF HEALTH (SDOH): PROBLEM RELATED TO PRIMARY SUPPORT GROUP, UNSPECIFIED: Z63.9

## 2019-09-18 NOTE — PROGRESS NOTES
Individual Psychotherapy (PhD/LCSW)    9/3/2019    [facility]:  [facility]         Therapeutic Intervention: Met with patient.  Outpatient - Insight oriented psychotherapy 45 min - CPT code 93263 and Outpatient - Supportive psychotherapy 45 min - CPT Code 17106    Chief complaint/reason for encounter: anxiety and interpersonal     Interval history and content of current session:   24 year old female patient returned to clinic for follow up psychotherapy after a prolonged absence.  Last seen for therapy 18.  She had scheduled but cancelled three appointments in that period.  She reported some good moments with mood and calm, but in general continuing overwhelming anxiety.  Endorsed some recent panic episodes.  Talked about underlying feelings of insecurity financially, as her boyfriend is working very short-term temp jobs.  Reported her father's beloved dog just , so she is concerned for her dad's state of mind at the moment, too.  Described her anxiety level and up and down, some days functions, others overwhelmed.  She has not worked since late in , overwhelmed by anxiety when trying to work.  Talked about her past track record with employment, only 5 or 6 jobs in her life, none lasting more than a month.  She said the first was in , the last from November to December of .  She said some she was terminated from, presumably for failure to perform, and two she quit because she just felt overwhelmed and assumed she was going to be fired anyway.  She had dropped out of the 11th grade due also to feeling overwhelmed with anxiety and complete lack of confidence in her ability.   In the context of her marriage, then, she came into it with very little confidence, reported experiencing only love and support from her 's family, and yet continually worried about being judged negatively.  Discussed concept of self-fulfilling prophecy vs general good-will of people who express care for her.  Patient  denied any si/hi, psychosis, cognitive deficits, mood swings, or substance abuse.  Plan is to follow up in clinic 9/18/19.       Treatment plan:  · Target symptoms: anxiety , adjustment, interpersonal  · Why chosen therapy is appropriate versus another modality: relevant to diagnosis  · Outcome monitoring methods: self-report, observation  · Therapeutic intervention type: insight oriented psychotherapy    Risk parameters:  Patient reports no suicidal ideation  Patient reports no homicidal ideation  Patient reports no self-injurious behavior  Patient reports no violent behavior    Verbal deficits: None    Patient's response to intervention:  The patient's response to intervention is accepting.    Progress toward goals and other mental status changes:  The patient's progress toward goals is good.    Diagnosis:     ICD-10-CM ICD-9-CM   1. ARMANDO (generalized anxiety disorder) F41.1 300.02   2. Family dynamics problem Z63.9 V61.9       Plan:  individual psychotherapy and medication management by physician    Return to clinic: as scheduled    Length of Service (minutes): 45

## 2019-09-26 ENCOUNTER — PATIENT MESSAGE (OUTPATIENT)
Dept: PSYCHIATRY | Facility: CLINIC | Age: 24
End: 2019-09-26

## 2019-09-27 ENCOUNTER — PATIENT MESSAGE (OUTPATIENT)
Dept: PSYCHIATRY | Facility: CLINIC | Age: 24
End: 2019-09-27

## 2019-09-27 RX ORDER — ESCITALOPRAM OXALATE 20 MG/1
20 TABLET ORAL DAILY
Qty: 30 TABLET | Refills: 2 | Status: SHIPPED | OUTPATIENT
Start: 2019-09-27 | End: 2019-10-29 | Stop reason: SDUPTHER

## 2019-10-01 ENCOUNTER — OFFICE VISIT (OUTPATIENT)
Dept: PSYCHIATRY | Facility: CLINIC | Age: 24
End: 2019-10-01
Payer: MEDICAID

## 2019-10-01 DIAGNOSIS — F41.1 GAD (GENERALIZED ANXIETY DISORDER): Primary | ICD-10-CM

## 2019-10-01 DIAGNOSIS — Z63.9 RELATIONSHIP PROBLEMS: ICD-10-CM

## 2019-10-01 PROCEDURE — 90834 PSYTX W PT 45 MINUTES: CPT | Mod: AJ,HB,S$PBB, | Performed by: SOCIAL WORKER

## 2019-10-01 PROCEDURE — 90834 PR PSYCHOTHERAPY W/PATIENT, 45 MIN: ICD-10-PCS | Mod: AJ,HB,S$PBB, | Performed by: SOCIAL WORKER

## 2019-10-01 SDOH — SOCIAL DETERMINANTS OF HEALTH (SDOH): PROBLEM RELATED TO PRIMARY SUPPORT GROUP, UNSPECIFIED: Z63.9

## 2019-10-01 NOTE — PROGRESS NOTES
Individual Psychotherapy (PhD/LCSW)    9/18/2019    Site:  [facility]         Therapeutic Intervention: Met with patient.  Outpatient - Insight oriented psychotherapy 45 min - CPT code 67624 and Outpatient - Supportive psychotherapy 45 min - CPT Code 05907    Chief complaint/reason for encounter: anxiety and interpersonal     Interval history and content of current session:   24 year old female patient returned to clinic for follow up psychotherapy after a prolonged absence.  Last seen for therapy 9/3/19.  Patient reported anxiety troubling her sleep, awaking in a panic recently, including the morning of this session, with a sense of having been dreaming something scary but no clear memory of the dream.  She reported just one day earlier, she was able to quell a panic attack that she felt starting to come on; she felt good about that.  She reported some dizziness that she said was being attributed possibly to her Busbar medication, and she was instructed to lower her dose of that.  She reported her boyfriend continues working odd jobs, some for family; currently on a work project expected to last about one week.  She continues to be at home, not trusting her own ability to hold onto a job even if she is able to secure one.  Her track record is of never having held a job for very long.  Discussed strategies for positive self-talk in the context of her recent success at talking herself down from a panic attack.  Patient denied any si/hi, psychosis, cognitive deficits, mood swings, or substance abuse.  Plan is to follow up in clinic 10/1/19.       Treatment plan:  · Target symptoms: anxiety , adjustment, interpersonal  · Why chosen therapy is appropriate versus another modality: relevant to diagnosis  · Outcome monitoring methods: self-report, observation  · Therapeutic intervention type: insight oriented psychotherapy    Risk parameters:  Patient reports no suicidal ideation  Patient reports no homicidal  ideation  Patient reports no self-injurious behavior  Patient reports no violent behavior    Verbal deficits: None    Patient's response to intervention:  The patient's response to intervention is accepting.    Progress toward goals and other mental status changes:  The patient's progress toward goals is good.    Diagnosis:     ICD-10-CM ICD-9-CM   1. ARMANDO (generalized anxiety disorder) F41.1 300.02   2. Family dynamics problem Z63.9 V61.9       Plan:  individual psychotherapy and medication management by physician    Return to clinic: as scheduled    Length of Service (minutes): 45

## 2019-10-22 NOTE — PROGRESS NOTES
Individual Psychotherapy (PhD/LCSW)    10/1/2019    Site:  [facility]         Therapeutic Intervention: Met with patient.  Outpatient - Insight oriented psychotherapy 45 min - CPT code 96522 and Outpatient - Supportive psychotherapy 45 min - CPT Code 42835    Chief complaint/reason for encounter: anxiety and interpersonal     Interval history and content of current session:   Late entry for 10/1/19.  24 year old female patient returned to clinic for follow up psychotherapy.  Last seen for therapy 9/18/19.  Talked about her new medication change, the previous three days being on a high dose of Lexapro, 20 mg up from 20.  She had previously switched form Lexapro 10mg to Busbar for several weeks, but she reported dizziness was a side effect of the Busbar, and she could not stay on it.  Now back to Lexapro but at a high dose.  Reporting her boyfriend has a new construction work assignment.  She is hoping it will be long-term.  She reported her most recent panic attack was about one week before this session, and she said she was able to resolve it fairly quickly with her boyfriend talking her through it.  Patient denied any si/hi, psychosis, cognitive deficits, mood swings, or substance abuse.  Plan is to follow up in clinic 10/29/19.       Treatment plan:  · Target symptoms: anxiety , adjustment, interpersonal  · Why chosen therapy is appropriate versus another modality: relevant to diagnosis  · Outcome monitoring methods: self-report, observation  · Therapeutic intervention type: insight oriented psychotherapy    Risk parameters:  Patient reports no suicidal ideation  Patient reports no homicidal ideation  Patient reports no self-injurious behavior  Patient reports no violent behavior    Verbal deficits: None    Patient's response to intervention:  The patient's response to intervention is accepting.    Progress toward goals and other mental status changes:  The patient's progress toward goals is good.    Diagnosis:      ICD-10-CM ICD-9-CM   1. ARMANDO (generalized anxiety disorder) F41.1 300.02   2. Relationship problems Z63.9 313.3       Plan:  individual psychotherapy and medication management by physician    Return to clinic: as scheduled    Length of Service (minutes): 45

## 2019-10-29 ENCOUNTER — OFFICE VISIT (OUTPATIENT)
Dept: PSYCHIATRY | Facility: CLINIC | Age: 24
End: 2019-10-29
Payer: MEDICAID

## 2019-10-29 DIAGNOSIS — Z86.59 HISTORY OF PANIC ATTACKS: ICD-10-CM

## 2019-10-29 DIAGNOSIS — F41.1 GAD (GENERALIZED ANXIETY DISORDER): Primary | ICD-10-CM

## 2019-10-29 DIAGNOSIS — Z63.9 DIFFICULTY WITH FAMILY: ICD-10-CM

## 2019-10-29 PROCEDURE — 99214 PR OFFICE/OUTPT VISIT, EST, LEVL IV, 30-39 MIN: ICD-10-PCS | Mod: AF,HB,S$PBB, | Performed by: PSYCHIATRY & NEUROLOGY

## 2019-10-29 PROCEDURE — 90834 PR PSYCHOTHERAPY W/PATIENT, 45 MIN: ICD-10-PCS | Mod: HP,HB,, | Performed by: SOCIAL WORKER

## 2019-10-29 PROCEDURE — 99999 PR PBB SHADOW E&M-EST. PATIENT-LVL I: ICD-10-PCS | Mod: PBBFAC,,, | Performed by: SOCIAL WORKER

## 2019-10-29 PROCEDURE — 99999 PR PBB SHADOW E&M-EST. PATIENT-LVL I: CPT | Mod: PBBFAC,,, | Performed by: SOCIAL WORKER

## 2019-10-29 PROCEDURE — 99214 OFFICE O/P EST MOD 30 MIN: CPT | Mod: AF,HB,S$PBB, | Performed by: PSYCHIATRY & NEUROLOGY

## 2019-10-29 PROCEDURE — 99211 OFF/OP EST MAY X REQ PHY/QHP: CPT | Mod: PBBFAC | Performed by: PSYCHIATRY & NEUROLOGY

## 2019-10-29 PROCEDURE — 99999 PR PBB SHADOW E&M-EST. PATIENT-LVL I: ICD-10-PCS | Mod: PBBFAC,,, | Performed by: PSYCHIATRY & NEUROLOGY

## 2019-10-29 PROCEDURE — 99211 OFF/OP EST MAY X REQ PHY/QHP: CPT | Mod: PBBFAC,27 | Performed by: SOCIAL WORKER

## 2019-10-29 PROCEDURE — 99999 PR PBB SHADOW E&M-EST. PATIENT-LVL I: CPT | Mod: PBBFAC,,, | Performed by: PSYCHIATRY & NEUROLOGY

## 2019-10-29 PROCEDURE — 90834 PSYTX W PT 45 MINUTES: CPT | Mod: HP,HB,, | Performed by: SOCIAL WORKER

## 2019-10-29 RX ORDER — ARIPIPRAZOLE 5 MG/1
5 TABLET ORAL DAILY
Qty: 30 TABLET | Refills: 2 | Status: SHIPPED | OUTPATIENT
Start: 2019-10-29 | End: 2020-03-10

## 2019-10-29 RX ORDER — ESCITALOPRAM OXALATE 20 MG/1
20 TABLET ORAL DAILY
Qty: 30 TABLET | Refills: 2 | Status: SHIPPED | OUTPATIENT
Start: 2019-10-29 | End: 2020-01-10

## 2019-10-29 SDOH — SOCIAL DETERMINANTS OF HEALTH (SDOH): PROBLEM RELATED TO PRIMARY SUPPORT GROUP, UNSPECIFIED: Z63.9

## 2019-10-29 NOTE — PROGRESS NOTES
"Outpatient Psychiatry Follow-up Visit (MD/NP)    10/29/2019    Cony Lora, a 24 y.o. female, presenting for follow-up visit. Met with patient.    Reason for Encounter: Patient complains of anxiety.    Interval History: Patient seen & interviewed, last seen about 1 month ago. Moods mostly ok, though complains of low energy, wanting to sleep a lot. Anticipates depression as holidays approach. Mom and dad have trouble in their relationship, so doesn't know how others will be. 1 panic attack 1 month ago. Sleep is better, though still tired on rising. Still taking medication regularly.   No new or different meds. Health has been generally ok. Has had some nausea with increased dose. Less anxiety, more desire to socialize. No other side effects.     Previous trials -   Sertraline  Duloxetine  Escitalopram.     Background: Pt is a 24 y/o F who presents for establishment of care, referred by Dr. Kennedy, seen for psychotherapy by Javier Cardoso starting in July. Reports anxiety symptoms back to middle school, probably 7th grade, starting with social anxiety, worrying about others' judgement. Symptoms have gotten worse, more pervasive, & with concern for personal safety, meta-anxiety about anxiety, avoidance. Denies history of jovani trauma. Says that she "doesn't go anywhere anymore", dropped out of HS due to anxiety. has lost or quit multiple jobs due to symptoms/ attendance. Understands nature of her fear is exaggerated. Has taken xanax (remote) from Dr. Laron Schreiber about 5 days/week. Was prescribed wellbutrin by Dr. Kennedy, but just managed two doses before self-discontinuing due to intolerable side effects (tingling).      Started therapy last year. Interrupted by insurance loss, then back. Thinks therapy helps. Xanax helps when she has a panic attack. Sometimes able to abort prior to attacks by deep breathing and self-talk. Depression since about age 16 after a bad relationship.     Past meds: buprop - side effects. " "  Sertraline - more depressed.     FamHx: mom, dad, sister - anxiety. "but I'm the worst".     Psych Hx: as above. Attempts at work have been unsuccessful due to "doubt myself", panic attacks, avoidance.     Social Hx: see below. Lives with boyfriend and his brother and sister-in-law. Supportive relationship. No maltreatment. Good relationship with mom, 2 brothers, "so-so with sister", not much with dad (who has been quite critical; dad also doesn't approve of her bf who is black. Dad is also an alcoholic).     History of present illness: 22 year old  female presented for initial evaluation, referred by Hilda Kennedy MD, in Internal Medicine, with chief complaint of chronic generalized anxiety, sleep deficits, frequent tearfulness, 40 lbs weight gain over approximately the past year. Reporting difficulty following through to complete tasks but considers it laziness & procrastination, not particularly focus difficulty. Endorsed difficulty with motivation with many tasks, as long as she can recall. Hated school both socially & academically. Pt described distinct family history of anxiety in both parents & sister. Pt stated she dropped out of the 11th grade secondary to anxiety & quickly obtained her GED.  Reported stress in family from other's alcohol use problems & his negativity & intrusiveness when under the influence. Pt said she started to distance herself as a teen.  Reported also a lot of racial tension as patient has dated inter-racially & father angrily disapproved and been insulting. She has been in her current committed relationship the past 5 years, & her boyfriend is . Pt said she has been unable to maintain employment due to her anxiety, bailing out of jobs as a result of insecurity on new jobs. She said she is supported by her mother & boyfriend. 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 Pt denied any si/hi, psychosis, " cognitive deficit, rages, mood swings, or substance use problems. Identified therapeutic goals include reducing anxiety & improving coping skills for anxiety and interpersonal conflict.      Symptoms:   ? Mood: tearfulness and poor motivation  ? Anxiety: excessive anxiety/worry and social phobia, decreased sleep  ? Substance abuse: denied  ? Cognitive functioning: denied  ? Health behaviors: noncontributory     Psychiatric history: psychotropic management by PCP     Medical history: 40 pounds weight gain in the past year; currently moderately obese; kidney stones; denied any other physical/medical conditions     Family history of psychiatric illness: Mother, father, and sister all markedly anxious; father also with chronic alcohol use problems. Paternal grandfather also reported to be alcoholic.     Social history: Grew up in [place], the youngest of 4 siblings; has two brothers & a sister. Raised by both parents who remain together. Pt reported father's alcohol abuse led to many conflicts & tensions in the family home; pt feeling undermined, judged, not at all affirmed by him. She reported hating school, due in significant part to her anxiety. Reports chronic fear of judgment and inadequacy; impulse is to avoid social situations. Described father as having always been negative & judgmental as long as she can remember. Said father's mother was also the same way, very negative, prejudiced, & controlling. Pt very aware of racist/racially intollerant attitudes in her father & paternal grandparents. Reports she dropped out of school & promptly obtained her GED at age 15. Reported 2 committed relationships, the first one ending at age 17; said it was a bad relationship. Has been with current boyfriend the past 5 years; describing him as supportive & understanding  Reported both relationships have been interracial, evoking much conflict with her father & his side of the family. She has just avoided him as much as  possible. Pt unemployed, feeling unable to tolerate the social aspect of work settings. Reports being financially supported by boyfriend & her mother. No children; never . No  experience.       Substance use: Alcohol: infrequent   Drugs: sporadic marijuana use from age 13 to current; last reported use August 17th.   Tobacco: former smoker age 13 to 18   Caffeine: none    Review Of Systems:     GENERAL:  No weight gain or loss  SKIN:  No rashes or lacerations  HEAD:  No headaches  EYES:  No exophthalmos, jaundice or blindness  EARS:  No dizziness, tinnitus or hearing loss  NOSE:  No changes in smell  MOUTH & THROAT:  No dyskinetic movements or obvious goiter  CHEST:  No shortness of breath, hyperventilation or cough  CARDIOVASCULAR:  No tachycardia or chest pain  ABDOMEN:  No nausea, vomiting, pain, constipation or diarrhea  URINARY:  No frequency, dysuria or sexual dysfunction  ENDOCRINE:  No polydipsia, polyuria  MUSCULOSKELETAL:  No pain or stiffness of the joints  NEUROLOGIC:  No weakness, sensory changes, seizures, confusion, memory loss, tremor or other abnormal movements    Current Evaluation:     Nutritional Screening: Considering the patient's height and weight, medications, medical history and preferences, should a referral be made to the dietitian? no    Constitutional  Vitals:  Most recent vital signs, dated less than 90 days prior to this appointment, were not reviewed.    There were no vitals filed for this visit.     General:  unremarkable, age appropriate     Musculoskeletal  Muscle Strength/Tone:  no tremor, no tic   Gait & Station:  non-ataxic     Psychiatric  Appearance: casually dressed & groomed;   Behavior: calm,   Cooperation: cooperative with assessment  Speech: normal rate, volume, tone  Thought Process: linear, goal-directed  Thought Content: No suicidal or homicidal ideation; no delusions  Affect: anxious  Mood: anxious  Perceptions: No auditory or visual  hallucinations  Level of Consciousness: alert throughout interview  Insight: fair  Cognition: Oriented to person, place, time, & situation  Memory: no apparent deficits to general clinical interview; not formally assessed  Attention/Concentration: no apparent deficits to general clinical interview; not formally assessed  Fund of Knowledge: average by vocabulary/education    Laboratory Data  No visits with results within 1 Month(s) from this visit.   Latest known visit with results is:   Lab Visit on 09/22/2017   Component Date Value Ref Range Status    Specimen UA 09/22/2017 Urine, Clean Catch   Final    Color, UA 09/22/2017 Yellow  Yellow, Straw, Julia Final    Appearance, UA 09/22/2017 Clear  Clear Final    pH, UA 09/22/2017 6.0  5.0 - 8.0 Final    Specific Gravity, UA 09/22/2017 1.015  1.005 - 1.030 Final    Protein, UA 09/22/2017 Negative  Negative Final    Glucose, UA 09/22/2017 Negative  Negative Final    Ketones, UA 09/22/2017 Negative  Negative Final    Bilirubin (UA) 09/22/2017 Negative  Negative Final    Occult Blood UA 09/22/2017 1+* Negative Final    Nitrite, UA 09/22/2017 Negative  Negative Final    Leukocytes, UA 09/22/2017 Negative  Negative Final    RBC, UA 09/22/2017 4  0 - 4 /hpf Final    Microscopic Comment 09/22/2017 SEE COMMENT   Final     Medications  Outpatient Encounter Medications as of 10/29/2019   Medication Sig Dispense Refill    escitalopram oxalate (LEXAPRO) 20 MG tablet Take 1 tablet (20 mg total) by mouth once daily. 30 tablet 2    SPRINTEC, 28, 0.25-35 mg-mcg per tablet Take 1 tablet by mouth once daily.  11     No facility-administered encounter medications on file as of 10/29/2019.      Assessment - Diagnosis - Goals:     Impression: 23 y/o F with hx of severe and pervasive anxiety, dysfunction in multiple life roles including occupational dysfunction. Has had limited previous treatment, modest benefits from xanax and psychotherapy. Some benefits from duloxetine,  but ongoing symptoms, no better response at higher doses. Insufficient response to escitalopram. Ongoing sleep symptoms.     Dx: Generalized anxiety disorder, insomnia    Treatment Goals:  Specify outcomes written in observable, behavioral terms: reduce symptoms by self-reports and scales. Improve functioning.     Treatment Plan/Recommendations:   · esitalopram + aripiprazole.  · Discussed risks, benefits, and alternatives to treatment plan documented above with patient. I answered all patient questions related to this plan and patient expressed understanding and agreement.     Return to Clinic: 2 months    Counseling time: 10 minutes  Total time: 25 minutes    ELAINE Ga MD  Psychiatry  [facility]  [address] , [place] [address]  [phone]

## 2019-11-22 NOTE — PROGRESS NOTES
"Individual Psychotherapy (PhD/LCSW)    10/29/2019    [facility]         Therapeutic Intervention: Met with patient.  Outpatient - Insight oriented psychotherapy 45 min - CPT code 36221 and Outpatient - Supportive psychotherapy 45 min - CPT Code 55934    Chief complaint/reason for encounter: anxiety and interpersonal     Interval history and content of current session:   Late entry for 10/29/19.  24 year old female patient returned to clinic for follow up psychotherapy.  Previous session was 10/1/19.  Patient presented as euthymic, reporting "much less" worry in recent months; feeling more accepted by boyfriend's family, more alright with their understood opinions of her.  Reported no recent panic attacks but awareness of ongoing anxiety, "always there."  She talked about past family of origin stressful experiences, of her parents arguing for years about whether or not to divorces,  spouses for years, of father's "very cluster B" traits, of him frequently saddling her with guilt trips and pressuring her to rescue him repeatedly from problems of his own making.  Patient expressed valente at a new puppy she has acquired, a pitbull lab mix, describing herself and "thrilled," with someone to focus on taking care of.  Patient denied any si/hi, psychosis, cognitive deficits, mood swings, or substance abuse.  Plan is to follow up in clinic within a month, tba.       Treatment plan:  · Target symptoms: anxiety , adjustment, interpersonal  · Why chosen therapy is appropriate versus another modality: relevant to diagnosis  · Outcome monitoring methods: self-report, observation  · Therapeutic intervention type: insight oriented psychotherapy    Risk parameters:  Patient reports no suicidal ideation  Patient reports no homicidal ideation  Patient reports no self-injurious behavior  Patient reports no violent behavior    Verbal deficits: None    Patient's response to intervention:  The patient's response to " intervention is accepting.    Progress toward goals and other mental status changes:  The patient's progress toward goals is good.    Diagnosis:     ICD-10-CM ICD-9-CM   1. ARMANDO (generalized anxiety disorder) F41.1 300.02   2. History of panic attacks Z86.59 V11.8   3. Difficulty with family Z63.9 V61.9       Plan:  individual psychotherapy and medication management by physician    Return to clinic: as scheduled    Length of Service (minutes): 45

## 2020-01-10 ENCOUNTER — OFFICE VISIT (OUTPATIENT)
Dept: PSYCHIATRY | Facility: CLINIC | Age: 25
End: 2020-01-10
Payer: MEDICAID

## 2020-01-10 VITALS
SYSTOLIC BLOOD PRESSURE: 115 MMHG | HEART RATE: 64 BPM | DIASTOLIC BLOOD PRESSURE: 66 MMHG | WEIGHT: 217.81 LBS | BODY MASS INDEX: 34.11 KG/M2

## 2020-01-10 DIAGNOSIS — F33.1 MDD (MAJOR DEPRESSIVE DISORDER), RECURRENT EPISODE, MODERATE: Primary | ICD-10-CM

## 2020-01-10 DIAGNOSIS — F41.1 GAD (GENERALIZED ANXIETY DISORDER): ICD-10-CM

## 2020-01-10 PROCEDURE — 99212 OFFICE O/P EST SF 10 MIN: CPT | Mod: PBBFAC | Performed by: PSYCHIATRY & NEUROLOGY

## 2020-01-10 PROCEDURE — 99214 PR OFFICE/OUTPT VISIT, EST, LEVL IV, 30-39 MIN: ICD-10-PCS | Mod: AF,HB,S$PBB, | Performed by: PSYCHIATRY & NEUROLOGY

## 2020-01-10 PROCEDURE — 99214 OFFICE O/P EST MOD 30 MIN: CPT | Mod: AF,HB,S$PBB, | Performed by: PSYCHIATRY & NEUROLOGY

## 2020-01-10 PROCEDURE — 99999 PR PBB SHADOW E&M-EST. PATIENT-LVL II: CPT | Mod: PBBFAC,,, | Performed by: PSYCHIATRY & NEUROLOGY

## 2020-01-10 PROCEDURE — 99999 PR PBB SHADOW E&M-EST. PATIENT-LVL II: ICD-10-PCS | Mod: PBBFAC,,, | Performed by: PSYCHIATRY & NEUROLOGY

## 2020-01-10 RX ORDER — VENLAFAXINE HYDROCHLORIDE 75 MG/1
CAPSULE, EXTENDED RELEASE ORAL
Qty: 60 CAPSULE | Refills: 2 | Status: SHIPPED | OUTPATIENT
Start: 2020-01-10 | End: 2020-03-10

## 2020-01-10 NOTE — PROGRESS NOTES
"Outpatient Psychiatry Follow-up Visit (MD/NP)    1/10/2020    Cony Lora, a 24 y.o. female, presenting for follow-up visit. Met with patient.    Reason for Encounter: Patient complains of anxiety.    Interval History: Patient seen & interviewed, last seen about 2 months ago. Anxious, without apparent reason. Denies apparent provocation, no worries or new troubles. Made it through holidays better than anticipated. Low motivation & desire. Adherent to medication x when has been off abilify (not covered). Denies side effects. No new symptoms since last visit.     Previous trials:    Sertraline (mood symptoms worsened despite it).   Duloxetine (worked for awhile then stopped)  Escitalopram.   wellbutrin    Background: Pt is a 22 y/o F who presents for establishment of care, referred by Dr. Kennedy, seen for psychotherapy by Javier Cardoso starting in July. Reports anxiety symptoms back to middle school, probably 7th grade, starting with social anxiety, worrying about others' judgement. Symptoms have gotten worse, more pervasive, & with concern for personal safety, meta-anxiety about anxiety, avoidance. Denies history of jovani trauma. Says that she "doesn't go anywhere anymore", dropped out of  due to anxiety. has lost or quit multiple jobs due to symptoms/ attendance. Understands nature of her fear is exaggerated. Has taken xanax (remote) from Dr. Laron Schreiber about 5 days/week. Was prescribed wellbutrin by Dr. Kennedy, but just managed two doses before self-discontinuing due to intolerable side effects (tingling).      Started therapy last year. Interrupted by insurance loss, then back. Thinks therapy helps. Xanax helps when she has a panic attack. Sometimes able to abort prior to attacks by deep breathing and self-talk. Depression since about age 16 after a bad relationship.     Past meds: buprop - side effects.   Sertraline - more depressed.     FamHx: mom, dad, sister - anxiety. "but I'm the worst".     Psych " "Hx: as above. Attempts at work have been unsuccessful due to "doubt myself", panic attacks, avoidance.     Social Hx: see below. Lives with boyfriend and his brother and sister-in-law. Supportive relationship. No maltreatment. Good relationship with mom, 2 brothers, "so-so with sister", not much with dad (who has been quite critical; dad also doesn't approve of her bf who is black. Dad is also an alcoholic).     History of present illness: 22 year old  female presented for initial evaluation, referred by Hilda Kennedy MD, in Internal Medicine, with chief complaint of chronic generalized anxiety, sleep deficits, frequent tearfulness, 40 lbs weight gain over approximately the past year. Reporting difficulty following through to complete tasks but considers it laziness & procrastination, not particularly focus difficulty. Endorsed difficulty with motivation with many tasks, as long as she can recall. Hated school both socially & academically. Pt described distinct family history of anxiety in both parents & sister. Pt stated she dropped out of the 11th grade secondary to anxiety & quickly obtained her GED.  Reported stress in family from other's alcohol use problems & his negativity & intrusiveness when under the influence. Pt said she started to distance herself as a teen.  Reported also a lot of racial tension as patient has dated inter-racially & father angrily disapproved and been insulting. She has been in her current committed relationship the past 5 years, & her boyfriend is . Pt said she has been unable to maintain employment due to her anxiety, bailing out of jobs as a result of insecurity on new jobs. She said she is supported by her mother & boyfriend. Semi-active [employer] but will watch services on TV rather than face people live in [place] services. Pt denied any si/hi, psychosis, cognitive deficit, rages, mood swings, or substance use problems. Identified therapeutic goals include " reducing anxiety & improving coping skills for anxiety and interpersonal conflict.      Symptoms:   ? Mood: tearfulness and poor motivation  ? Anxiety: excessive anxiety/worry and social phobia, decreased sleep  ? Substance abuse: denied  ? Cognitive functioning: denied  ? Health behaviors: noncontributory     Psychiatric history: psychotropic management by PCP     Medical history: 40 pounds weight gain in the past year; currently moderately obese; kidney stones; denied any other physical/medical conditions     Family history of psychiatric illness: Mother, father, and sister all markedly anxious; father also with chronic alcohol use problems. Paternal grandfather also reported to be alcoholic.     Social history: Grew up in [place], the youngest of 4 siblings; has two brothers & a sister. Raised by both parents who remain together. Pt reported father's alcohol abuse led to many conflicts & tensions in the family home; pt feeling undermined, judged, not at all affirmed by him. She reported hating school, due in significant part to her anxiety. Reports chronic fear of judgment and inadequacy; impulse is to avoid social situations. Described father as having always been negative & judgmental as long as she can remember. Said father's mother was also the same way, very negative, prejudiced, & controlling. Pt very aware of racist/racially intollerant attitudes in her father & paternal grandparents. Reports she dropped out of school & promptly obtained her GED at age 15. Reported 2 committed relationships, the first one ending at age 17; said it was a bad relationship. Has been with current boyfriend the past 5 years; describing him as supportive & understanding  Reported both relationships have been interracial, evoking much conflict with her father & his side of the family. She has just avoided him as much as possible. Pt unemployed, feeling unable to tolerate the social aspect of work settings. Reports being  financially supported by boyfriend & her mother. No children; never . No  experience.       Substance use: Alcohol: infrequent   Drugs: sporadic marijuana use from age 13 to current; last reported use August 17th.   Tobacco: former smoker age 13 to 18   Caffeine: none    Review Of Systems:     GENERAL:  No weight gain or loss  SKIN:  No rashes or lacerations  HEAD:  No headaches  EYES:  No exophthalmos, jaundice or blindness  EARS:  No dizziness, tinnitus or hearing loss  NOSE:  No changes in smell  MOUTH & THROAT:  No dyskinetic movements or obvious goiter  CHEST:  No shortness of breath, hyperventilation or cough  CARDIOVASCULAR:  No tachycardia or chest pain  ABDOMEN:  No nausea, vomiting, pain, constipation or diarrhea  URINARY:  No frequency, dysuria or sexual dysfunction  ENDOCRINE:  No polydipsia, polyuria  MUSCULOSKELETAL:  No pain or stiffness of the joints  NEUROLOGIC:  No weakness, sensory changes, seizures, confusion, memory loss, tremor or other abnormal movements    Current Evaluation:     Nutritional Screening: Considering the patient's height and weight, medications, medical history and preferences, should a referral be made to the dietitian? no    Constitutional  Vitals:  Most recent vital signs, dated less than 90 days prior to this appointment, were not reviewed.    Vitals:    01/10/20 1432   BP: 115/66   Pulse: 64   Weight: 98.8 kg (217 lb 13 oz)        General:  unremarkable, age appropriate     Musculoskeletal  Muscle Strength/Tone:  no tremor, no tic   Gait & Station:  non-ataxic     Psychiatric  Appearance: casually dressed & groomed;   Behavior: calm,   Cooperation: cooperative with assessment  Speech: normal rate, volume, tone  Thought Process: linear, goal-directed  Thought Content: No suicidal or homicidal ideation; no delusions  Affect: anxious  Mood: anxious  Perceptions: No auditory or visual hallucinations  Level of Consciousness: alert throughout interview  Insight:  fair  Cognition: Oriented to person, place, time, & situation  Memory: no apparent deficits to general clinical interview; not formally assessed  Attention/Concentration: no apparent deficits to general clinical interview; not formally assessed  Fund of Knowledge: average by vocabulary/education    Laboratory Data  No visits with results within 1 Month(s) from this visit.   Latest known visit with results is:   Lab Visit on 09/22/2017   Component Date Value Ref Range Status    Specimen UA 09/22/2017 Urine, Clean Catch   Final    Color, UA 09/22/2017 Yellow  Yellow, Straw, Juila Final    Appearance, UA 09/22/2017 Clear  Clear Final    pH, UA 09/22/2017 6.0  5.0 - 8.0 Final    Specific Gravity, UA 09/22/2017 1.015  1.005 - 1.030 Final    Protein, UA 09/22/2017 Negative  Negative Final    Glucose, UA 09/22/2017 Negative  Negative Final    Ketones, UA 09/22/2017 Negative  Negative Final    Bilirubin (UA) 09/22/2017 Negative  Negative Final    Occult Blood UA 09/22/2017 1+* Negative Final    Nitrite, UA 09/22/2017 Negative  Negative Final    Leukocytes, UA 09/22/2017 Negative  Negative Final    RBC, UA 09/22/2017 4  0 - 4 /hpf Final    Microscopic Comment 09/22/2017 SEE COMMENT   Final     Medications  Outpatient Encounter Medications as of 1/10/2020   Medication Sig Dispense Refill    ARIPiprazole (ABILIFY) 5 MG Tab Take 1 tablet (5 mg total) by mouth once daily. 30 tablet 2    escitalopram oxalate (LEXAPRO) 20 MG tablet Take 1 tablet (20 mg total) by mouth once daily. 30 tablet 2    SPRINTEC, 28, 0.25-35 mg-mcg per tablet Take 1 tablet by mouth once daily.  11     No facility-administered encounter medications on file as of 1/10/2020.      Assessment - Diagnosis - Goals:     Impression: 25 y/o F with hx of severe and pervasive anxiety, dysfunction in multiple life roles including occupational dysfunction. Has had limited previous treatment, modest benefits from xanax and psychotherapy. Some benefits  from duloxetine, but ongoing symptoms, no better response at higher doses. Insufficient response to escitalopram. Ongoing sleep symptoms.     Dx: MDD, Generalized anxiety disorder, insomnia    Treatment Goals:  Specify outcomes written in observable, behavioral terms: reduce symptoms by self-reports and scales. Improve functioning.     Treatment Plan/Recommendations:   · Venlafaxine trial.   · Discussed risks, benefits, and alternatives to treatment plan documented above with patient. I answered all patient questions related to this plan and patient expressed understanding and agreement.     Return to Clinic: 2 months    Counseling time: 10 minutes  Total time: 25 minutes    ELAINE Ga MD  Psychiatry  [facility]  [address] , [place] [address]  [phone]

## 2020-03-10 ENCOUNTER — OFFICE VISIT (OUTPATIENT)
Dept: PSYCHIATRY | Facility: CLINIC | Age: 25
End: 2020-03-10
Payer: MEDICAID

## 2020-03-10 VITALS
WEIGHT: 205.69 LBS | HEIGHT: 67 IN | HEART RATE: 104 BPM | SYSTOLIC BLOOD PRESSURE: 126 MMHG | BODY MASS INDEX: 32.28 KG/M2 | DIASTOLIC BLOOD PRESSURE: 89 MMHG

## 2020-03-10 DIAGNOSIS — F41.1 GAD (GENERALIZED ANXIETY DISORDER): Primary | ICD-10-CM

## 2020-03-10 PROCEDURE — 99999 PR PBB SHADOW E&M-EST. PATIENT-LVL II: ICD-10-PCS | Mod: PBBFAC,,, | Performed by: PSYCHIATRY & NEUROLOGY

## 2020-03-10 PROCEDURE — 99212 OFFICE O/P EST SF 10 MIN: CPT | Mod: PBBFAC | Performed by: PSYCHIATRY & NEUROLOGY

## 2020-03-10 PROCEDURE — 99213 PR OFFICE/OUTPT VISIT, EST, LEVL III, 20-29 MIN: ICD-10-PCS | Mod: AF,HB,S$PBB, | Performed by: PSYCHIATRY & NEUROLOGY

## 2020-03-10 PROCEDURE — 99999 PR PBB SHADOW E&M-EST. PATIENT-LVL II: CPT | Mod: PBBFAC,,, | Performed by: PSYCHIATRY & NEUROLOGY

## 2020-03-10 PROCEDURE — 99213 OFFICE O/P EST LOW 20 MIN: CPT | Mod: AF,HB,S$PBB, | Performed by: PSYCHIATRY & NEUROLOGY

## 2020-03-10 RX ORDER — VENLAFAXINE HYDROCHLORIDE 150 MG/1
150 CAPSULE, EXTENDED RELEASE ORAL DAILY
Qty: 30 CAPSULE | Refills: 2 | Status: SHIPPED | OUTPATIENT
Start: 2020-03-10 | End: 2020-05-22

## 2020-03-10 NOTE — PROGRESS NOTES
"Outpatient Psychiatry Follow-up Visit (MD/NP)    3/10/2020    Cony Lora, a 25 y.o. female, presenting for follow-up visit. Met with patient.    Reason for Encounter: Patient complains of anxiety.    Interval History: Patient seen & interviewed, last seen about 2 months ago. Less anxious, Bf working out of town. She's hosting his parents from [facility].   Health is ok. No new illnesses. New medication working well. Some dry mouth & mildly decreased appetite. Generally tolerable.     Previous trials:    Sertraline (mood symptoms worsened despite it).   Duloxetine (worked for awhile then stopped)  Escitalopram.   wellbutrin    Background: Pt is a 24 y/o F who presents for establishment of care, referred by Dr. Kennedy, seen for psychotherapy by Javier Cardoso starting in July. Reports anxiety symptoms back to middle school, probably 7th grade, starting with social anxiety, worrying about others' judgement. Symptoms have gotten worse, more pervasive, & with concern for personal safety, meta-anxiety about anxiety, avoidance. Denies history of jovani trauma. Says that she "doesn't go anywhere anymore", dropped out of  due to anxiety. has lost or quit multiple jobs due to symptoms/ attendance. Understands nature of her fear is exaggerated. Has taken xanax (remote) from Dr. Laron Schreiber about 5 days/week. Was prescribed wellbutrin by Dr. Kennedy, but just managed two doses before self-discontinuing due to intolerable side effects (tingling).      Started therapy last year. Interrupted by insurance loss, then back. Thinks therapy helps. Xanax helps when she has a panic attack. Sometimes able to abort prior to attacks by deep breathing and self-talk. Depression since about age 16 after a bad relationship.     Past meds: buprop - side effects.   Sertraline - more depressed.     FamHx: mom, dad, sister - anxiety. "but I'm the worst".     Psych Hx: as above. Attempts at work have been unsuccessful due to "doubt myself", panic " "attacks, avoidance.     Social Hx: see below. Lives with boyfriend and his brother and sister-in-law. Supportive relationship. No maltreatment. Good relationship with mom, 2 brothers, "so-so with sister", not much with dad (who has been quite critical; dad also doesn't approve of her bf who is black. Dad is also an alcoholic).     History of present illness: 22 year old  female presented for initial evaluation, referred by Hilda Kennedy MD, in Internal Medicine, with chief complaint of chronic generalized anxiety, sleep deficits, frequent tearfulness, 40 lbs weight gain over approximately the past year. Reporting difficulty following through to complete tasks but considers it laziness & procrastination, not particularly focus difficulty. Endorsed difficulty with motivation with many tasks, as long as she can recall. Hated school both socially & academically. Pt described distinct family history of anxiety in both parents & sister. Pt stated she dropped out of the 11th grade secondary to anxiety & quickly obtained her GED.  Reported stress in family from other's alcohol use problems & his negativity & intrusiveness when under the influence. Pt said she started to distance herself as a teen.  Reported also a lot of racial tension as patient has dated inter-racially & father angrily disapproved and been insulting. She has been in her current committed relationship the past 5 years, & her boyfriend is . Pt said she has been unable to maintain employment due to her anxiety, bailing out of jobs as a result of insecurity on new jobs. She said she is supported by her mother & boyfriend. Semi-active [employer] but will watch services on TV rather than face people live in [employer] services. Pt denied any si/hi, psychosis, cognitive deficit, rages, mood swings, or substance use problems. Identified therapeutic goals include reducing anxiety & improving coping skills for anxiety and interpersonal conflict. "      Symptoms:   ? Mood: tearfulness and poor motivation  ? Anxiety: excessive anxiety/worry and social phobia, decreased sleep  ? Substance abuse: denied  ? Cognitive functioning: denied  ? Health behaviors: noncontributory     Psychiatric history: psychotropic management by PCP     Medical history: 40 pounds weight gain in the past year; currently moderately obese; kidney stones; denied any other physical/medical conditions     Family history of psychiatric illness: Mother, father, and sister all markedly anxious; father also with chronic alcohol use problems. Paternal grandfather also reported to be alcoholic.     Social history: Grew up in , the youngest of 4 siblings; has two brothers & a sister. Raised by both parents who remain together. Pt reported father's alcohol abuse led to many conflicts & tensions in the family home; pt feeling undermined, judged, not at all affirmed by him. She reported hating school, due in significant part to her anxiety. Reports chronic fear of judgment & inadequacy; impulse is to avoid social situations. Described father as having always been negative & judgmental as long as she can remember. Said father's mother was also the same way, very negative, prejudiced, & controlling. Pt very aware of racist/racially intollerant attitudes in her father & paternal grandparents. Reports she dropped out of school & promptly obtained her GED at age 15. Reported 2 committed relationships, the 1st one ending at age 17; said it was a bad relationship. Has been with current BF the past 5 years; describing him as supportive & understanding  Reported both relationships have been interracial, evoking much conflict with her father & his side of the family. She has just avoided him as much as possible. Pt unemployed, feeling unable to tolerate the social aspect of work settings. Reports being financially supported by boyfriend & her mother. No children; never . No  experience.   "     Substance use: Alcohol: infrequent   Drugs: sporadic marijuana use from age 13 to current; last reported use August 17th.   Tobacco: former smoker age 13 to 18   Caffeine: none    Review Of Systems:     GENERAL:  No weight gain or loss  SKIN:  No rashes or lacerations  HEAD:  No headaches  EYES:  No exophthalmos, jaundice or blindness  EARS:  No dizziness, tinnitus or hearing loss  NOSE:  No changes in smell  MOUTH & THROAT:  No dyskinetic movements or obvious goiter  CHEST:  No shortness of breath, hyperventilation or cough  CARDIOVASCULAR:  No tachycardia or chest pain  ABDOMEN:  No nausea, vomiting, pain, constipation or diarrhea  URINARY:  No frequency, dysuria or sexual dysfunction  ENDOCRINE:  No polydipsia, polyuria  MUSCULOSKELETAL:  No pain or stiffness of the joints  NEUROLOGIC:  No weakness, sensory changes, seizures, confusion, memory loss, tremor or other abnormal movements    Current Evaluation:     Nutritional Screening: Considering the patient's height and weight, medications, medical history and preferences, should a referral be made to the dietitian? no    Constitutional  Vitals:  Most recent vital signs, dated less than 90 days prior to this appointment, were not reviewed.    Vitals:    03/10/20 1440   BP: 126/89   Pulse: 104   Weight: 93.3 kg (205 lb 11 oz)   Height: 5' 7" (1.702 m)        General:  unremarkable, age appropriate     Musculoskeletal  Muscle Strength/Tone:  no tremor, no tic   Gait & Station:  non-ataxic     Psychiatric  Appearance: casually dressed & groomed;   Behavior: calm,   Cooperation: cooperative with assessment  Speech: normal rate, volume, tone  Thought Process: linear, goal-directed  Thought Content: No suicidal or homicidal ideation; no delusions  Affect: anxious  Mood: anxious  Perceptions: No auditory or visual hallucinations  Level of Consciousness: alert throughout interview  Insight: fair  Cognition: Oriented to person, place, time, & situation  Memory: no " apparent deficits to general clinical interview; not formally assessed  Attention/Concentration: no apparent deficits to general clinical interview; not formally assessed  Fund of Knowledge: average by vocabulary/education    Laboratory Data  No visits with results within 1 Month(s) from this visit.   Latest known visit with results is:   Lab Visit on 09/22/2017   Component Date Value Ref Range Status    Specimen UA 09/22/2017 Urine, Clean Catch   Final    Color, UA 09/22/2017 Yellow  Yellow, Straw, Julia Final    Appearance, UA 09/22/2017 Clear  Clear Final    pH, UA 09/22/2017 6.0  5.0 - 8.0 Final    Specific Gravity, UA 09/22/2017 1.015  1.005 - 1.030 Final    Protein, UA 09/22/2017 Negative  Negative Final    Glucose, UA 09/22/2017 Negative  Negative Final    Ketones, UA 09/22/2017 Negative  Negative Final    Bilirubin (UA) 09/22/2017 Negative  Negative Final    Occult Blood UA 09/22/2017 1+* Negative Final    Nitrite, UA 09/22/2017 Negative  Negative Final    Leukocytes, UA 09/22/2017 Negative  Negative Final    RBC, UA 09/22/2017 4  0 - 4 /hpf Final    Microscopic Comment 09/22/2017 SEE COMMENT   Final     Medications  Outpatient Encounter Medications as of 3/10/2020   Medication Sig Dispense Refill    ARIPiprazole (ABILIFY) 5 MG Tab Take 1 tablet (5 mg total) by mouth once daily. 30 tablet 2    SPRINTEC, 28, 0.25-35 mg-mcg per tablet Take 1 tablet by mouth once daily.  11    venlafaxine (EFFEXOR-XR) 75 MG 24 hr capsule Take 1 capsule daily for seven days then 2 capsules daily thereafter 60 capsule 2     No facility-administered encounter medications on file as of 3/10/2020.      Assessment - Diagnosis - Goals:     Impression: 23 y/o F with hx of severe and pervasive anxiety, dysfunction in multiple life roles including occupational dysfunction. Has had limited previous treatment, modest benefits from xanax and psychotherapy. Some benefits from duloxetine, but ongoing symptoms, no better  response at higher doses. Insufficient response to escitalopram. Ongoing sleep symptoms.     Dx: MDD, Generalized anxiety disorder, insomnia    Treatment Goals:  Specify outcomes written in observable, behavioral terms: reduce symptoms by self-reports and scales. Improve functioning.     Treatment Plan/Recommendations:   · Venlafaxine trial.   · Discussed risks, benefits, and alternatives to treatment plan documented above with patient. I answered all patient questions related to this plan and patient expressed understanding and agreement.     Return to Clinic: 2 months    Counseling time: 10 minutes  Total time: 25 minutes    ELAINE Ga MD  Psychiatry  [facility]  [address] , [place] [address]  [phone]

## 2020-04-01 ENCOUNTER — TELEPHONE (OUTPATIENT)
Dept: INTERNAL MEDICINE | Facility: CLINIC | Age: 25
End: 2020-04-01

## 2020-04-01 NOTE — TELEPHONE ENCOUNTER
----- Message from Magali Euceda sent at 4/1/2020  1:46 PM CDT -----  Contact: Pt   Pt is requesting a call back regarding sever headaches pt had been experiencing for a week   Pt stated tylenol, advil, and excedrin have not helped with symptoms   Pt would like an appt to be seen to get a prescription to assist with symptoms     Pt can be reached at [phone]

## 2020-04-27 RX ORDER — VENLAFAXINE HYDROCHLORIDE 75 MG/1
CAPSULE, EXTENDED RELEASE ORAL
Qty: 60 CAPSULE | Refills: 0 | OUTPATIENT
Start: 2020-04-27

## 2020-05-22 RX ORDER — VENLAFAXINE HYDROCHLORIDE 150 MG/1
CAPSULE, EXTENDED RELEASE ORAL
Qty: 30 CAPSULE | Refills: 0 | Status: SHIPPED | OUTPATIENT
Start: 2020-05-22 | End: 2020-06-09 | Stop reason: SDUPTHER

## 2020-06-08 ENCOUNTER — PATIENT MESSAGE (OUTPATIENT)
Dept: PSYCHIATRY | Facility: CLINIC | Age: 25
End: 2020-06-08

## 2020-06-09 RX ORDER — TEMAZEPAM 15 MG/1
15 CAPSULE ORAL NIGHTLY PRN
Qty: 30 CAPSULE | Refills: 0 | Status: SHIPPED | OUTPATIENT
Start: 2020-06-09 | End: 2020-07-09

## 2020-06-09 RX ORDER — VENLAFAXINE HYDROCHLORIDE 150 MG/1
150 CAPSULE, EXTENDED RELEASE ORAL DAILY
Qty: 30 CAPSULE | Refills: 1 | Status: SHIPPED | OUTPATIENT
Start: 2020-06-09 | End: 2020-08-05 | Stop reason: SDUPTHER

## 2020-08-05 ENCOUNTER — OFFICE VISIT (OUTPATIENT)
Dept: PSYCHIATRY | Facility: CLINIC | Age: 25
End: 2020-08-05
Payer: MEDICAID

## 2020-08-05 DIAGNOSIS — F41.1 GAD (GENERALIZED ANXIETY DISORDER): Primary | ICD-10-CM

## 2020-08-05 DIAGNOSIS — G47.00 INSOMNIA, UNSPECIFIED TYPE: ICD-10-CM

## 2020-08-05 PROCEDURE — 99211 OFF/OP EST MAY X REQ PHY/QHP: CPT | Mod: PBBFAC | Performed by: PSYCHIATRY & NEUROLOGY

## 2020-08-05 PROCEDURE — 99999 PR PBB SHADOW E&M-EST. PATIENT-LVL I: CPT | Mod: AF,HB,PBBFAC, | Performed by: PSYCHIATRY & NEUROLOGY

## 2020-08-05 PROCEDURE — 99213 OFFICE O/P EST LOW 20 MIN: CPT | Mod: AF,HB,S$PBB, | Performed by: PSYCHIATRY & NEUROLOGY

## 2020-08-05 PROCEDURE — 99213 PR OFFICE/OUTPT VISIT, EST, LEVL III, 20-29 MIN: ICD-10-PCS | Mod: AF,HB,S$PBB, | Performed by: PSYCHIATRY & NEUROLOGY

## 2020-08-05 PROCEDURE — 99999 PR PBB SHADOW E&M-EST. PATIENT-LVL I: ICD-10-PCS | Mod: AF,HB,PBBFAC, | Performed by: PSYCHIATRY & NEUROLOGY

## 2020-08-05 RX ORDER — VENLAFAXINE HYDROCHLORIDE 150 MG/1
150 CAPSULE, EXTENDED RELEASE ORAL DAILY
Qty: 30 CAPSULE | Refills: 3 | Status: SHIPPED | OUTPATIENT
Start: 2020-08-05 | End: 2021-10-20

## 2020-08-05 NOTE — PROGRESS NOTES
"Outpatient Psychiatry Follow-up Visit (MD/NP)    8/5/2020    Cony Lora, a 25 y.o. female, presenting for follow-up visit. Met with patient.    Reason for Encounter: Patient complains of anxiety.    Interval History: Patient seen & interviewed, last seen about 2 months ago. Generally feeling well. No new symptoms since last visit. Will be moving within weeks, primarily for BF's job. They've already found has a place.   In generally good health. No new medications. No new illnesses. Socially distancing/masking for COVID. Sleep is ok. Taking some naps. Appetite for food is good.     Previous trials:    Sertraline (mood symptoms worsened despite it).   Duloxetine (worked for awhile then stopped)  Escitalopram.   wellbutrin    Background: Pt is a 24 y/o F who presents for establishment of care, referred by Dr. Kennedy, seen for psychotherapy by Javier Cardoso starting in July. Reports anxiety symptoms back to middle school, probably 7th grade, starting with social anxiety, worrying about others' judgement. Symptoms have gotten worse, more pervasive, & with concern for personal safety, meta-anxiety about anxiety, avoidance. Denies history of jovani trauma. Says that she "doesn't go anywhere anymore", dropped out of  due to anxiety. has lost or quit multiple jobs due to symptoms/ attendance. Understands nature of her fear is exaggerated. Has taken xanax (remote) from Dr. Laron Schreiber about 5 days/week. Was prescribed wellbutrin by Dr. Kennedy, but just managed two doses before self-discontinuing due to intolerable side effects (tingling).      Started therapy last year. Interrupted by insurance loss, then back. Thinks therapy helps. Xanax helps when she has a panic attack. Sometimes able to abort prior to attacks by deep breathing and self-talk. Depression since about age 16 after a bad relationship.     Past meds: buprop - side effects.   Sertraline - more depressed.     FamHx: mom, dad, sister - anxiety. "but I'm the " "worst".     Psych Hx: as above. Attempts at work have been unsuccessful due to "doubt myself", panic attacks, avoidance.     Social Hx: see below. Lives with boyfriend and his brother and sister-in-law. Supportive relationship. No maltreatment. Good relationship with mom, 2 brothers, "so-so with sister", not much with dad (who has been quite critical; dad also doesn't approve of her bf who is black. Dad is also an alcoholic).     History of present illness: 22 year old  female presented for initial evaluation, referred by Hilda Kennedy MD, in Internal Medicine, with chief complaint of chronic generalized anxiety, sleep deficits, frequent tearfulness, 40 lbs weight gain over approximately the past year. Reporting difficulty following through to complete tasks but considers it laziness & procrastination, not particularly focus difficulty. Endorsed difficulty with motivation with many tasks, as long as she can recall. Hated school both socially & academically. Pt described distinct family history of anxiety in both parents & sister. Pt stated she dropped out of the 11th grade secondary to anxiety & quickly obtained her GED.  Reported stress in family from other's alcohol use problems & his negativity & intrusiveness when under the influence. Pt said she started to distance herself as a teen.  Reported also a lot of racial tension as patient has dated inter-racially & father angrily disapproved and been insulting. She has been in her current committed relationship the past 5 years, & her boyfriend is . Pt said she has been unable to maintain employment due to her anxiety, bailing out of jobs as a result of insecurity on new jobs. She said she is supported by her mother & boyfriend. Semi-active Amish but will watch services on TV rather than face people live in Pent[employer]alism services. Pt denied any si/hi, psychosis, cognitive deficit, rages, mood swings, or substance use problems. Identified " therapeutic goals include reducing anxiety & improving coping skills for anxiety and interpersonal conflict.      Symptoms:   ? Mood: tearfulness and poor motivation  ? Anxiety: excessive anxiety/worry and social phobia, decreased sleep  ? Substance abuse: denied  ? Cognitive functioning: denied  ? Health behaviors: noncontributory     Psychiatric history: psychotropic management by PCP     Medical history: 40 pounds weight gain in the past year; currently moderately obese; kidney stones; denied any other physical/medical conditions     Family history of psychiatric illness: Mother, father, and sister all markedly anxious; father also with chronic alcohol use problems. Paternal grandfather also reported to be alcoholic.     Social history: Grew up in , the youngest of 4 siblings; has two brothers & a sister. Raised by both parents who remain together. Pt reported father's alcohol abuse led to many conflicts & tensions in the family home; pt feeling undermined, judged, not at all affirmed by him. She reported hating school, due in significant part to her anxiety. Reports chronic fear of judgment & inadequacy; impulse is to avoid social situations. Described father as having always been negative & judgmental as long as she can remember. Said father's mother was also the same way, very negative, prejudiced, & controlling. Pt very aware of racist/racially intollerant attitudes in her father & paternal grandparents. Reports she dropped out of school & promptly obtained her GED at age 15. Reported 2 committed relationships, the 1st one ending at age 17; said it was a bad relationship. Has been with current BF the past 5 years; describing him as supportive & understanding  Reported both relationships have been interracial, evoking much conflict with her father & his side of the family. She has just avoided him as much as possible. Pt unemployed, feeling unable to tolerate the social aspect of work settings. Reports being  financially supported by boyfriend & her mother. No children; never . No  experience.       Substance use: Alcohol: infrequent   Drugs: sporadic marijuana use from age 13 to current; last reported use August 17th.   Tobacco: former smoker age 13 to 18   Caffeine: none    Review Of Systems:     GENERAL:  No weight gain or loss  SKIN:  No rashes or lacerations  HEAD:  No headaches  EYES:  No exophthalmos, jaundice or blindness  EARS:  No dizziness, tinnitus or hearing loss  NOSE:  No changes in smell  MOUTH & THROAT:  No dyskinetic movements or obvious goiter  CHEST:  No shortness of breath, hyperventilation or cough  CARDIOVASCULAR:  No tachycardia or chest pain  ABDOMEN:  No nausea, vomiting, pain, constipation or diarrhea  URINARY:  No frequency, dysuria or sexual dysfunction  ENDOCRINE:  No polydipsia, polyuria  MUSCULOSKELETAL:  No pain or stiffness of the joints  NEUROLOGIC:  No weakness, sensory changes, seizures, confusion, memory loss, tremor or other abnormal movements    Current Evaluation:     Nutritional Screening: Considering the patient's height and weight, medications, medical history and preferences, should a referral be made to the dietitian? no    Constitutional  Vitals:  Most recent vital signs, dated less than 90 days prior to this appointment, were not reviewed.    There were no vitals filed for this visit.     General:  unremarkable, age appropriate     Musculoskeletal  Muscle Strength/Tone:  no tremor, no tic   Gait & Station:  non-ataxic     Psychiatric  Appearance: casually dressed & groomed;   Behavior: calm,   Cooperation: cooperative with assessment  Speech: normal rate, volume, tone  Thought Process: linear, goal-directed  Thought Content: No suicidal or homicidal ideation; no delusions  Affect: anxious  Mood: anxious  Perceptions: No auditory or visual hallucinations  Level of Consciousness: alert throughout interview  Insight: fair  Cognition: Oriented to person, place,  time, & situation  Memory: no apparent deficits to general clinical interview; not formally assessed  Attention/Concentration: no apparent deficits to general clinical interview; not formally assessed  Fund of Knowledge: average by vocabulary/education    Laboratory Data  No visits with results within 1 Month(s) from this visit.   Latest known visit with results is:   Lab Visit on 09/22/2017   Component Date Value Ref Range Status    Specimen UA 09/22/2017 Urine, Clean Catch   Final    Color, UA 09/22/2017 Yellow  Yellow, Straw, Julia Final    Appearance, UA 09/22/2017 Clear  Clear Final    pH, UA 09/22/2017 6.0  5.0 - 8.0 Final    Specific Gravity, UA 09/22/2017 1.015  1.005 - 1.030 Final    Protein, UA 09/22/2017 Negative  Negative Final    Glucose, UA 09/22/2017 Negative  Negative Final    Ketones, UA 09/22/2017 Negative  Negative Final    Bilirubin (UA) 09/22/2017 Negative  Negative Final    Occult Blood UA 09/22/2017 1+* Negative Final    Nitrite, UA 09/22/2017 Negative  Negative Final    Leukocytes, UA 09/22/2017 Negative  Negative Final    RBC, UA 09/22/2017 4  0 - 4 /hpf Final    Microscopic Comment 09/22/2017 SEE COMMENT   Final     Medications  Outpatient Encounter Medications as of 8/5/2020   Medication Sig Dispense Refill    SPRINTEC, 28, 0.25-35 mg-mcg per tablet Take 1 tablet by mouth once daily.  11    venlafaxine (EFFEXOR-XR) 150 MG Cp24 Take 1 capsule (150 mg total) by mouth once daily. 30 capsule 1     No facility-administered encounter medications on file as of 8/5/2020.      Assessment - Diagnosis - Goals:     Impression: 26 y/o F with hx of severe and pervasive anxiety, dysfunction in multiple life roles including occupational dysfunction. Has had limited previous treatment, modest benefits from xanax and psychotherapy. Some benefits from duloxetine, but ongoing symptoms, no better response at higher doses. Insufficient response to escitalopram. Ongoing sleep symptoms. Moving to  [place] soon.     Dx: MDD, Generalized anxiety disorder, insomnia    Treatment Goals:  Specify outcomes written in observable, behavioral terms: reduce symptoms by self-reports and scales. Improve functioning.     Treatment Plan/Recommendations:   · Continue venlafaxine   · Letter for emotional support animal.   · Discussed risks, benefits, and alternatives to treatment plan documented above with patient. I answered all patient questions related to this plan and patient expressed understanding and agreement.     Return to Clinic: mt Ga MD  Psychiatry  [facility]  [address] , [place] [address] [phone]

## 2021-06-23 ENCOUNTER — OFFICE VISIT (OUTPATIENT)
Dept: OBSTETRICS AND GYNECOLOGY | Facility: CLINIC | Age: 26
End: 2021-06-23
Payer: MEDICAID

## 2021-06-23 VITALS
DIASTOLIC BLOOD PRESSURE: 70 MMHG | SYSTOLIC BLOOD PRESSURE: 110 MMHG | BODY MASS INDEX: 25.67 KG/M2 | HEIGHT: 67 IN | WEIGHT: 163.56 LBS

## 2021-06-23 DIAGNOSIS — Z01.419 WELL WOMAN EXAM WITH ROUTINE GYNECOLOGICAL EXAM: Primary | ICD-10-CM

## 2021-06-23 DIAGNOSIS — Z12.4 ENCOUNTER FOR SCREENING FOR CERVICAL CANCER: ICD-10-CM

## 2021-06-23 DIAGNOSIS — N89.8 VAGINAL DISCHARGE: ICD-10-CM

## 2021-06-23 PROCEDURE — 88175 CYTOPATH C/V AUTO FLUID REDO: CPT | Performed by: NURSE PRACTITIONER

## 2021-06-23 PROCEDURE — 99999 PR PBB SHADOW E&M-EST. PATIENT-LVL III: CPT | Mod: PBBFAC,,, | Performed by: NURSE PRACTITIONER

## 2021-06-23 PROCEDURE — 99999 PR PBB SHADOW E&M-EST. PATIENT-LVL III: ICD-10-PCS | Mod: PBBFAC,,, | Performed by: NURSE PRACTITIONER

## 2021-06-23 PROCEDURE — 87661 TRICHOMONAS VAGINALIS AMPLIF: CPT | Performed by: NURSE PRACTITIONER

## 2021-06-23 PROCEDURE — 99213 OFFICE O/P EST LOW 20 MIN: CPT | Mod: PBBFAC | Performed by: NURSE PRACTITIONER

## 2021-06-23 PROCEDURE — 87481 CANDIDA DNA AMP PROBE: CPT | Mod: 59 | Performed by: NURSE PRACTITIONER

## 2021-06-23 PROCEDURE — 99385 PR PREVENTIVE VISIT,NEW,18-39: ICD-10-PCS | Mod: S$PBB,,, | Performed by: NURSE PRACTITIONER

## 2021-06-23 PROCEDURE — 99385 PREV VISIT NEW AGE 18-39: CPT | Mod: S$PBB,,, | Performed by: NURSE PRACTITIONER

## 2021-06-28 ENCOUNTER — PATIENT MESSAGE (OUTPATIENT)
Dept: OBSTETRICS AND GYNECOLOGY | Facility: CLINIC | Age: 26
End: 2021-06-28

## 2021-06-28 LAB
BACTERIAL VAGINOSIS DNA: POSITIVE
CANDIDA GLABRATA DNA: NEGATIVE
CANDIDA KRUSEI DNA: NEGATIVE
CANDIDA RRNA VAG QL PROBE: NEGATIVE
FINAL PATHOLOGIC DIAGNOSIS: NORMAL
Lab: NORMAL
T VAGINALIS RRNA GENITAL QL PROBE: NEGATIVE

## 2021-06-29 DIAGNOSIS — N76.0 BACTERIAL VAGINOSIS: Primary | ICD-10-CM

## 2021-06-29 DIAGNOSIS — B96.89 BACTERIAL VAGINOSIS: Primary | ICD-10-CM

## 2021-06-29 RX ORDER — METRONIDAZOLE 500 MG/1
500 TABLET ORAL 2 TIMES DAILY
Qty: 14 TABLET | Refills: 0 | Status: SHIPPED | OUTPATIENT
Start: 2021-06-29 | End: 2021-07-06

## 2021-09-24 ENCOUNTER — OFFICE VISIT (OUTPATIENT)
Dept: PSYCHIATRY | Facility: CLINIC | Age: 26
End: 2021-09-24
Payer: MEDICAID

## 2021-09-24 DIAGNOSIS — F33.1 MDD (MAJOR DEPRESSIVE DISORDER), RECURRENT EPISODE, MODERATE: ICD-10-CM

## 2021-09-24 DIAGNOSIS — F41.1 GAD (GENERALIZED ANXIETY DISORDER): Primary | ICD-10-CM

## 2021-09-24 PROCEDURE — 90791 PSYCH DIAGNOSTIC EVALUATION: CPT | Mod: AJ,HB,95, | Performed by: SOCIAL WORKER

## 2021-09-24 PROCEDURE — 90791 PR PSYCHIATRIC DIAGNOSTIC EVALUATION: ICD-10-PCS | Mod: AJ,HB,95, | Performed by: SOCIAL WORKER

## 2021-10-20 ENCOUNTER — OFFICE VISIT (OUTPATIENT)
Dept: PSYCHIATRY | Facility: CLINIC | Age: 26
End: 2021-10-20
Payer: MEDICAID

## 2021-10-20 DIAGNOSIS — F41.1 GAD (GENERALIZED ANXIETY DISORDER): Primary | ICD-10-CM

## 2021-10-20 PROCEDURE — 99214 PR OFFICE/OUTPT VISIT, EST, LEVL IV, 30-39 MIN: ICD-10-PCS | Mod: S$PBB,AF,HB, | Performed by: PSYCHIATRY & NEUROLOGY

## 2021-10-20 PROCEDURE — 99999 PR PBB SHADOW E&M-EST. PATIENT-LVL I: CPT | Mod: PBBFAC,AF,HB, | Performed by: PSYCHIATRY & NEUROLOGY

## 2021-10-20 PROCEDURE — 99214 OFFICE O/P EST MOD 30 MIN: CPT | Mod: S$PBB,AF,HB, | Performed by: PSYCHIATRY & NEUROLOGY

## 2021-10-20 PROCEDURE — 99211 OFF/OP EST MAY X REQ PHY/QHP: CPT | Mod: PBBFAC | Performed by: PSYCHIATRY & NEUROLOGY

## 2021-10-20 PROCEDURE — 99999 PR PBB SHADOW E&M-EST. PATIENT-LVL I: ICD-10-PCS | Mod: PBBFAC,AF,HB, | Performed by: PSYCHIATRY & NEUROLOGY

## 2021-10-20 RX ORDER — VENLAFAXINE HYDROCHLORIDE 75 MG/1
CAPSULE, EXTENDED RELEASE ORAL
Qty: 60 CAPSULE | Refills: 2 | Status: SHIPPED | OUTPATIENT
Start: 2021-10-20 | End: 2022-12-21

## 2021-10-27 ENCOUNTER — TELEPHONE (OUTPATIENT)
Dept: PSYCHIATRY | Facility: CLINIC | Age: 26
End: 2021-10-27
Payer: MEDICAID

## 2021-11-12 ENCOUNTER — OFFICE VISIT (OUTPATIENT)
Dept: PSYCHIATRY | Facility: CLINIC | Age: 26
End: 2021-11-12
Payer: MEDICAID

## 2021-11-12 DIAGNOSIS — F33.1 MDD (MAJOR DEPRESSIVE DISORDER), RECURRENT EPISODE, MODERATE: ICD-10-CM

## 2021-11-12 DIAGNOSIS — F41.1 GAD (GENERALIZED ANXIETY DISORDER): Primary | ICD-10-CM

## 2021-11-12 PROCEDURE — 90834 PSYTX W PT 45 MINUTES: CPT | Mod: AJ,HB,95, | Performed by: SOCIAL WORKER

## 2021-11-12 PROCEDURE — 90834 PR PSYCHOTHERAPY W/PATIENT, 45 MIN: ICD-10-PCS | Mod: AJ,HB,95, | Performed by: SOCIAL WORKER

## 2021-12-01 ENCOUNTER — OFFICE VISIT (OUTPATIENT)
Dept: PSYCHIATRY | Facility: CLINIC | Age: 26
End: 2021-12-01
Payer: MEDICAID

## 2021-12-01 DIAGNOSIS — F33.1 MDD (MAJOR DEPRESSIVE DISORDER), RECURRENT EPISODE, MODERATE: ICD-10-CM

## 2021-12-01 DIAGNOSIS — F41.1 GAD (GENERALIZED ANXIETY DISORDER): Primary | ICD-10-CM

## 2021-12-01 PROCEDURE — 90834 PSYTX W PT 45 MINUTES: CPT | Mod: AJ,HB,95, | Performed by: SOCIAL WORKER

## 2021-12-01 PROCEDURE — 90834 PR PSYCHOTHERAPY W/PATIENT, 45 MIN: ICD-10-PCS | Mod: AJ,HB,95, | Performed by: SOCIAL WORKER

## 2021-12-20 ENCOUNTER — OFFICE VISIT (OUTPATIENT)
Dept: PSYCHIATRY | Facility: CLINIC | Age: 26
End: 2021-12-20
Payer: MEDICAID

## 2021-12-20 DIAGNOSIS — F41.1 GAD (GENERALIZED ANXIETY DISORDER): Primary | ICD-10-CM

## 2021-12-20 PROCEDURE — 99213 PR OFFICE/OUTPT VISIT, EST, LEVL III, 20-29 MIN: ICD-10-PCS | Mod: S$PBB,AF,HB, | Performed by: PSYCHIATRY & NEUROLOGY

## 2021-12-20 PROCEDURE — 99213 OFFICE O/P EST LOW 20 MIN: CPT | Mod: S$PBB,AF,HB, | Performed by: PSYCHIATRY & NEUROLOGY

## 2021-12-20 RX ORDER — VENLAFAXINE HYDROCHLORIDE 150 MG/1
150 CAPSULE, EXTENDED RELEASE ORAL DAILY
Qty: 30 CAPSULE | Refills: 3 | Status: SHIPPED | OUTPATIENT
Start: 2021-12-20 | End: 2022-06-27

## 2022-02-09 ENCOUNTER — TELEPHONE (OUTPATIENT)
Dept: PSYCHIATRY | Facility: CLINIC | Age: 27
End: 2022-02-09
Payer: MEDICAID

## 2022-02-23 ENCOUNTER — OFFICE VISIT (OUTPATIENT)
Dept: PSYCHIATRY | Facility: CLINIC | Age: 27
End: 2022-02-23
Payer: MEDICAID

## 2022-02-23 DIAGNOSIS — F33.1 MDD (MAJOR DEPRESSIVE DISORDER), RECURRENT EPISODE, MODERATE: ICD-10-CM

## 2022-02-23 DIAGNOSIS — F41.1 GAD (GENERALIZED ANXIETY DISORDER): Primary | ICD-10-CM

## 2022-02-23 PROCEDURE — 1159F MED LIST DOCD IN RCRD: CPT | Mod: AJ,HB,CPTII,95 | Performed by: SOCIAL WORKER

## 2022-02-23 PROCEDURE — 90834 PSYTX W PT 45 MINUTES: CPT | Mod: AJ,HB,95, | Performed by: SOCIAL WORKER

## 2022-02-23 PROCEDURE — 1159F PR MEDICATION LIST DOCUMENTED IN MEDICAL RECORD: ICD-10-PCS | Mod: AJ,HB,CPTII,95 | Performed by: SOCIAL WORKER

## 2022-02-23 PROCEDURE — 90834 PR PSYCHOTHERAPY W/PATIENT, 45 MIN: ICD-10-PCS | Mod: AJ,HB,95, | Performed by: SOCIAL WORKER

## 2022-04-21 ENCOUNTER — OFFICE VISIT (OUTPATIENT)
Dept: PSYCHIATRY | Facility: CLINIC | Age: 27
End: 2022-04-21
Payer: MEDICAID

## 2022-04-21 DIAGNOSIS — G47.00 INSOMNIA, UNSPECIFIED TYPE: ICD-10-CM

## 2022-04-21 DIAGNOSIS — F41.1 GAD (GENERALIZED ANXIETY DISORDER): Primary | ICD-10-CM

## 2022-04-21 PROCEDURE — 99214 OFFICE O/P EST MOD 30 MIN: CPT | Mod: S$PBB,AF,HB, | Performed by: PSYCHIATRY & NEUROLOGY

## 2022-04-21 PROCEDURE — 99211 OFF/OP EST MAY X REQ PHY/QHP: CPT | Mod: PBBFAC | Performed by: PSYCHIATRY & NEUROLOGY

## 2022-04-21 PROCEDURE — 99214 PR OFFICE/OUTPT VISIT, EST, LEVL IV, 30-39 MIN: ICD-10-PCS | Mod: S$PBB,AF,HB, | Performed by: PSYCHIATRY & NEUROLOGY

## 2022-04-21 PROCEDURE — 99999 PR PBB SHADOW E&M-EST. PATIENT-LVL I: ICD-10-PCS | Mod: PBBFAC,AF,HB, | Performed by: PSYCHIATRY & NEUROLOGY

## 2022-04-21 PROCEDURE — 99999 PR PBB SHADOW E&M-EST. PATIENT-LVL I: CPT | Mod: PBBFAC,AF,HB, | Performed by: PSYCHIATRY & NEUROLOGY

## 2022-04-21 RX ORDER — VENLAFAXINE HYDROCHLORIDE 75 MG/1
CAPSULE, EXTENDED RELEASE ORAL
Qty: 90 CAPSULE | Refills: 2 | Status: SHIPPED | OUTPATIENT
Start: 2022-04-21 | End: 2022-08-01

## 2022-04-21 NOTE — PROGRESS NOTES
"Outpatient Psychiatry Follow-up Visit (MD/NP)    4/21/2022    Cony Lora, a 27 y.o. female, presenting for follow-up visit. Met with patient.    Reason for Encounter: Patient complains of anxiety.    Interval History: Patient seen & interviewed, last seen about 2 months ago.   Stressed by ongoing family difficulties. Bf's mother is post stroke. They're caring for her. Finished body-sculpting certification. No new illnesses.   Some discontinuation symptoms. No new medication. No changes in health.   Denies side effects.     Previous trials:    Sertraline (mood symptoms worsened despite it).   Duloxetine (worked for awhile then stopped)  Escitalopram.   wellbutrin    Background: Pt is a 24 y/o F who presents for establishment of care, referred by Dr. Kennedy, seen for psychotherapy by Javier Cardoso starting in July. Reports anxiety symptoms back to middle school, probably 7th grade, starting with social anxiety, worrying about others' judgement. Symptoms have gotten worse, more pervasive, & with concern for personal safety, meta-anxiety about anxiety, avoidance. Denies history of jovani trauma. Says that she "doesn't go anywhere anymore", dropped out of [employer] due to anxiety. has lost or quit multiple jobs due to symptoms/ attendance. Understands nature of her fear is exaggerated. Has taken xanax (remote) from Dr. Laron Schreiber about 5 days/week. Was prescribed wellbutrin by Dr. Kennedy, but just managed two doses before self-discontinuing due to intolerable side effects (tingling).      Started therapy last year. Interrupted by insurance loss, then back. Thinks therapy helps. Xanax helps when she has a panic attack. Sometimes able to abort prior to attacks by deep breathing and self-talk. Depression since about age 16 after a bad relationship.     Past meds: buprop - side effects.   Sertraline - more depressed.     FamHx: mom, dad, sister - anxiety. "but I'm the worst".     Psych Hx: as above. Attempts at work have been " "unsuccessful due to "doubt myself", panic attacks, avoidance.     Social Hx: see below. Lives with boyfriend and his brother and sister-in-law.    Moved back from [place].   Cheated on BF in April. "trying to work it out".   Health is ok. No new health problems.   Started new therapy. To go back next week.   No new illnesses.   Relationship with most family is ok. Problems with dad ongoing.   Stayed with family for some time before reconciling with boyfriend.          Supportive relationship. No maltreatment. Good relationship with mom, 2 brothers, "so-so with sister", not much with dad (who has been quite critical; dad also doesn't approve of her bf who is black. Dad is also an alcoholic).     HPI: 23 y/o WF presented for initial evaluation, referred by Hilda Kennedy MD, in Internal Medicine, with chief complaint of chronic generalized anxiety, sleep deficits, frequent tearfulness, 40 lbs weight gain over approximately the past year. Reporting difficulty following through to complete tasks but considers it laziness & procrastination, not particularly focus difficulty. Endorsed difficulty with motivation with many tasks, as long as she can recall. Hated school both socially & academically. Pt described distinct family history of anxiety in both parents & sister. Pt stated she dropped out of the 11th grade secondary to anxiety & quickly obtained her GED. Reported stress in family from other's alcohol use problems & his negativity & intrusiveness when under the influence. Pt said she started to distance herself as a teen.  Reported also a lot of racial tension as patient has dated inter-racially & father angrily disapproved and been insulting. She has been in her current committed relationship the past 5 years, & her boyfriend is . Pt said she has been unable to maintain employment due to her anxiety, bailing out of jobs as a result of insecurity on new jobs. She said she is supported by her mother & " boyfriend. Semi-active Protestant but will watch services on TV rather than face people live in Yazidism services. Pt denied any si/hi, psychosis, cognitive deficit, rages, mood swings, or substance use problems. Identified therapeutic goals include reducing anxiety & improving coping skills for anxiety and interpersonal conflict.      Symptoms:   ? Mood: tearfulness and poor motivation  ? Anxiety: excessive anxiety/worry and social phobia, decreased sleep  ? Substance abuse: denied  ? Cognitive functioning: denied  ? Health behaviors: noncontributory     Psychiatric history: psychotropic management by PCP     Medical history: 40 pounds weight gain in the past year; currently moderately obese; kidney stones; denied any other physical/medical conditions     Family history of psychiatric illness: Mother, father, and sister all markedly anxious; father also with chronic alcohol use problems. Paternal grandfather also reported to be alcoholic.     Social history: Grew up in , the youngest of 4 siblings; has two brothers & a sister. Raised by both parents who remain together. Pt reported father's alcohol abuse led to many conflicts & tensions in the family home; pt feeling undermined, judged, not at all affirmed by him. She reported hating school, due in significant part to her anxiety. Reports chronic fear of judgment & inadequacy; impulse is to avoid social situations. Described father as having always been negative & judgmental as long as she can remember. Said father's mother was also the same way, very negative, prejudiced, & controlling. Pt very aware of racist/racially intollerant attitudes in her father & paternal grandparents. Reports she dropped out of school & promptly obtained her GED at age 15. Reported 2 committed relationships, the 1st one ending at age 17; said it was a bad relationship. Has been with current BF the past 5 years; describing him as supportive & understanding  Reported both relationships  have been interracial, evoking much conflict with her father & his side of the family. She has just avoided him as much as possible. Pt unemployed, feeling unable to tolerate the social aspect of work settings. Reports being financially supported by boyfriend & her mother. No children; never . No  experience.       Substance use: Alcohol: infrequent   Drugs: sporadic marijuana use from age 13 to current; last reported use August 17th.   Tobacco: former smoker age 13 to 18   Caffeine: none    Review Of Systems:     GENERAL:  No weight gain or loss  SKIN:  No rashes or lacerations  HEAD:  No headaches  EYES:  No exophthalmos, jaundice or blindness  EARS:  No dizziness, tinnitus or hearing loss  NOSE:  No changes in smell  MOUTH & THROAT:  No dyskinetic movements or obvious goiter  CHEST:  No shortness of breath, hyperventilation or cough  CARDIOVASCULAR:  No tachycardia or chest pain  ABDOMEN:  No nausea, vomiting, pain, constipation or diarrhea  URINARY:  No frequency, dysuria or sexual dysfunction  ENDOCRINE:  No polydipsia, polyuria  MUSCULOSKELETAL:  No pain or stiffness of the joints  NEUROLOGIC:  No weakness, sensory changes, seizures, confusion, memory loss, tremor or other abnormal movements    Current Evaluation:     Nutritional Screening: Considering the patient's height and weight, medications, medical history and preferences, should a referral be made to the dietitian? no    Constitutional  Vitals:  Most recent vital signs, dated less than 90 days prior to this appointment, were not reviewed.    There were no vitals filed for this visit.     General:  unremarkable, age appropriate     Musculoskeletal  Muscle Strength/Tone:  no tremor, no tic   Gait & Station:  non-ataxic     Psychiatric  Appearance: casually dressed & groomed;   Behavior: calm,   Cooperation: cooperative with assessment  Speech: normal rate, volume, tone  Thought Process: linear, goal-directed  Thought Content: No suicidal  or homicidal ideation; no delusions  Affect: anxious  Mood: anxious  Perceptions: No auditory or visual hallucinations  Level of Consciousness: alert throughout interview  Insight: fair  Cognition: Oriented to person, place, time, & situation  Memory: no apparent deficits to general clinical interview; not formally assessed  Attention/Concentration: no apparent deficits to general clinical interview; not formally assessed  Fund of Knowledge: average by vocabulary/education    Laboratory Data  No visits with results within 1 Month(s) from this visit.   Latest known visit with results is:   Office Visit on 06/23/2021   Component Date Value Ref Range Status    Final Pathologic Diagnosis 06/23/2021    Final                    Value:Specimen Adequacy  Satisfactory for interpretation. Endocervical component is present.  [place] Category  Negative for intraepithelial lesion or malignancy.  Inflammation and bacteria present.      Disclaimer 06/23/2021    Final                    Value:The Pap smear is a screening test that aids in the detection of cervical  cancer and cancer precursors. Both false positive and false negative results  can occur. The test should be used at regular intervals, and positive results  should be confirmed before definitive therapy.  This liquid based specimen is processed using the  or  Thin PrepPAP  System. This specimen has been analyzed by the ThinPrep Imaging System  ([employer]), an automated imaging and review system which assists  the laboratory in evaluating cells on ThinPrep PAP tests. Following automated  imaging, selected fields from every slide are reviewed by a cytotechnologist  and/or pathologist.  Screening was performed at [facility], [address] [place] [address].      Trichomonas vaginalis 06/23/2021 Negative  Negative Final    Candida sp 06/23/2021 Negative  Negative Final    Candida glabrata DNA  06/23/2021 Negative  Negative Final    Kay krusei DNA 06/23/2021 Negative  Negative Final    Bacterial vaginosis DNA 06/23/2021 Positive (A) Negative Final     Medications  Outpatient Encounter Medications as of 4/21/2022   Medication Sig Dispense Refill    venlafaxine (EFFEXOR-XR) 150 MG Cp24 Take 1 capsule (150 mg total) by mouth once daily. 30 capsule 3    venlafaxine (EFFEXOR-XR) 75 MG 24 hr capsule Take 1 capsule daily for 7 days then 2 daily thereafter. 60 capsule 2     No facility-administered encounter medications on file as of 4/21/2022.     Assessment - Diagnosis - Goals:     Impression: 27 y/o F with hx of severe and pervasive anxiety, dysfunction in multiple life roles including occupational dysfunction. Has had limited previous treatment, modest benefits from xanax and psychotherapy. Some benefits from duloxetine, but ongoing symptoms, no better response at higher doses. Insufficient response to escitalopram. Ongoing sleep symptoms. Ongoing stressors, feeling and coping somewhat better.     Dx: MDD, Generalized anxiety disorder, insomnia    Treatment Goals:  Specify outcomes written in observable, behavioral terms: reduce symptoms by self-reports and scales. Improve functioning.     Treatment Plan/Recommendations:   · Venlafaxine 150 mg daily. Xanax prn.   · Encouraged therapy.   · Discussed risks, benefits, and alternatives to treatment plan documented above with patient. I answered all patient questions related to this plan and patient expressed understanding and agreement.     Return to Clinic: mt Ga MD  Psychiatry  [facility]  [address] , [place], [place] [id number] [phone]

## 2022-04-22 ENCOUNTER — OFFICE VISIT (OUTPATIENT)
Dept: PSYCHIATRY | Facility: CLINIC | Age: 27
End: 2022-04-22
Payer: MEDICAID

## 2022-04-22 DIAGNOSIS — F41.1 GAD (GENERALIZED ANXIETY DISORDER): Primary | ICD-10-CM

## 2022-04-22 DIAGNOSIS — F33.1 MDD (MAJOR DEPRESSIVE DISORDER), RECURRENT EPISODE, MODERATE: ICD-10-CM

## 2022-04-22 DIAGNOSIS — F12.20 CANNABIS DEPENDENCE: ICD-10-CM

## 2022-04-22 PROCEDURE — 1159F MED LIST DOCD IN RCRD: CPT | Mod: AJ,HB,CPTII,95 | Performed by: SOCIAL WORKER

## 2022-04-22 PROCEDURE — 1159F PR MEDICATION LIST DOCUMENTED IN MEDICAL RECORD: ICD-10-PCS | Mod: AJ,HB,CPTII,95 | Performed by: SOCIAL WORKER

## 2022-04-22 PROCEDURE — 90834 PSYTX W PT 45 MINUTES: CPT | Mod: AJ,HB,95, | Performed by: SOCIAL WORKER

## 2022-04-22 PROCEDURE — 90834 PR PSYCHOTHERAPY W/PATIENT, 45 MIN: ICD-10-PCS | Mod: AJ,HB,95, | Performed by: SOCIAL WORKER

## 2022-04-22 NOTE — PROGRESS NOTES
"  Individual Psychotherapy Follow-up Visit Progress Note (PhD/LCSW)     Outpatient - Insight oriented psychotherapy 45 min - CPT code 30436    Virtual visit with synchronous audio/video, Location of patient: home in [place].    Each patient provided medical services by telemedicine is: (1) informed of the relationship between the provider and patient and the respective role of any other health care provider with respect to management of the patient; and (2) notified that he or she may decline to receive medical services by telemedicine and may withdraw from such care at any time.    Crisis Disclaimer: Patient was informed that due to the virtual nature of the visit, that if a crisis develops, protocols will be implemented to ensure patient safety, including but not limited to: (1) Initiating a welfare check with local Law Enforcement, (2) Calling 1/National Crisis Hotline, and/or (3) Initiating PEC/CEC procedures.     4/22/2022  MRN: [id number]  Primary Care Provider: Laron Schreiber MD    Cony Lora is a 27 y.o. female who presents today for follow-up of depression and anxiety. Met with patient.        Subjective:       Patient report/content of session: Last seen 2/23/2022. Pt states "it's been a bit of a rough time." Her boyfriend's mother has been in the hospital. She has had several falls, and her brain cancer is back. Boyfriend is out of town for work. Pt has been crying and wanting to sleep a lot, feeling unmotivated. Rates her depression 4/10. She has been isolating and depends on her boyfriend for social support. Boyfriend's brother, sister-in-law and their 2 children, father, and now cousin all live in the home. They do not clean up after themselves and expect everyone in the home to babysit their children. Pt states that her social anxiety has not been as bad, but she is not looking for a job. 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 Recently went with her boyfriend to [place] in [place] and was not triggered by the crowds. Her depression has been worse and she has not been actively trying to cope with it. We discussed implementing a daily schedule and setting goals. Her marijuana use has increased. She is smoking about 6 blunts daily. Based on motivational interviewing, pt is in the pre-contemplation stage of change is not motivated to stop using at this time.     Current symptoms:   · Depression: depressed mood, fatigue, hypersomnia, decreased motivation, social withdrawal and diminished interest  · Anxiety: excessive anxiety/worry, restlessness/keyed up  · Substance abuse: substance tolerance, great deal of time spent with substance and activities reduced  · Cognitive functioning: denied  · Reena: none noted  · Psychosis: none noted        Objective:       Mental Status Evaluation  Appearance: unremarkable, age appropriate  Behavior: normal, cooperative  Speech: normal tone, normal rate, normal pitch, normal volume  Mood: depressed  Affect: congruent and appropriate, blunted  Thought Process: normal and logical  Thought Content: normal, no suicidality, no homicidality, delusions, or paranoia  Sensorium: grossly intact  Cognition: grossly intact  Insight: good  Judgment: adequate to circumstances    Risk parameters:  Patient reports no suicidal ideation  Patient reports no homicidal ideation  Patient reports no self-injurious behavior  Patient reports no violent behavior      Assessment & Plan:       Therapeutic interventions used: Assigned pt to practice relaxation on a daily basis  Pt was assisted in gaining insight into how worry is a form of avoidance of a feared problem and how it creates chronic tension  Pt was taught problem-solving strategies  Assessed pt's level of insight toward the presenting problems  Monitored the effectiveness and side effects of antidepressant medication prescribed by physician/NP/MP   Taught pt the  meaning and power of secondary gain in maintaining negative behavior patterns, e.g. refusing to take risks   Used motivational interviewing to assess stage of change  Educated pt re: risks of excessive drug use  Encouraged pt to implement a daily schedule and write down at least 2 short term goals    The patient's response to the interventions is accepting    The patient's progress toward treatment goals is good    Homework assigned: daily journaling and practice relaxation skills daily     Treatment plan:   A. Target symptoms: Depression, Anxiety and Poor Coping Skills   B. Therapeutic modalities: insight oriented psychotherapy, supportive psychotherapy  C. Why chosen therapy is appropriate versus another modality: relevant to diagnosis, evidence based practice   D. Outcome monitoring methods: self report, observation, rating scales, feedback from clinical staff     Visit Diagnosis:   1. ARMANDO (generalized anxiety disorder)    2. MDD (major depressive disorder), recurrent episode, moderate    3. Cannabis dependence        Follow-up: individual psychotherapy, medication management by physician and abstinence    Return to Clinic: 2 weeks    Length of Service (minutes): 45         Betzy Zelaya LCSW  Outpatient Psychiatry

## 2022-10-21 ENCOUNTER — OFFICE VISIT (OUTPATIENT)
Dept: PSYCHIATRY | Facility: CLINIC | Age: 27
End: 2022-10-21
Payer: MEDICAID

## 2022-10-21 VITALS
DIASTOLIC BLOOD PRESSURE: 88 MMHG | HEART RATE: 97 BPM | SYSTOLIC BLOOD PRESSURE: 123 MMHG | BODY MASS INDEX: 24.17 KG/M2 | WEIGHT: 154.31 LBS

## 2022-10-21 DIAGNOSIS — F41.1 GAD (GENERALIZED ANXIETY DISORDER): Primary | ICD-10-CM

## 2022-10-21 DIAGNOSIS — G47.00 INSOMNIA, UNSPECIFIED TYPE: ICD-10-CM

## 2022-10-21 PROCEDURE — 3074F SYST BP LT 130 MM HG: CPT | Mod: AF,HB,CPTII, | Performed by: PSYCHIATRY & NEUROLOGY

## 2022-10-21 PROCEDURE — 99999 PR PBB SHADOW E&M-EST. PATIENT-LVL II: CPT | Mod: PBBFAC,AF,HB, | Performed by: PSYCHIATRY & NEUROLOGY

## 2022-10-21 PROCEDURE — 99214 PR OFFICE/OUTPT VISIT, EST, LEVL IV, 30-39 MIN: ICD-10-PCS | Mod: S$PBB,AF,HB, | Performed by: PSYCHIATRY & NEUROLOGY

## 2022-10-21 PROCEDURE — 99999 PR PBB SHADOW E&M-EST. PATIENT-LVL II: ICD-10-PCS | Mod: PBBFAC,AF,HB, | Performed by: PSYCHIATRY & NEUROLOGY

## 2022-10-21 PROCEDURE — 3079F PR MOST RECENT DIASTOLIC BLOOD PRESSURE 80-89 MM HG: ICD-10-PCS | Mod: AF,HB,CPTII, | Performed by: PSYCHIATRY & NEUROLOGY

## 2022-10-21 PROCEDURE — 3079F DIAST BP 80-89 MM HG: CPT | Mod: AF,HB,CPTII, | Performed by: PSYCHIATRY & NEUROLOGY

## 2022-10-21 PROCEDURE — 3074F PR MOST RECENT SYSTOLIC BLOOD PRESSURE < 130 MM HG: ICD-10-PCS | Mod: AF,HB,CPTII, | Performed by: PSYCHIATRY & NEUROLOGY

## 2022-10-21 PROCEDURE — 99212 OFFICE O/P EST SF 10 MIN: CPT | Mod: PBBFAC | Performed by: PSYCHIATRY & NEUROLOGY

## 2022-10-21 PROCEDURE — 99214 OFFICE O/P EST MOD 30 MIN: CPT | Mod: S$PBB,AF,HB, | Performed by: PSYCHIATRY & NEUROLOGY

## 2022-10-21 RX ORDER — VENLAFAXINE HYDROCHLORIDE 75 MG/1
CAPSULE, EXTENDED RELEASE ORAL
Qty: 90 CAPSULE | Refills: 2 | Status: SHIPPED | OUTPATIENT
Start: 2022-10-21 | End: 2022-12-21 | Stop reason: SDUPTHER

## 2022-10-21 RX ORDER — TRAZODONE HYDROCHLORIDE 100 MG/1
100 TABLET ORAL NIGHTLY
Qty: 60 TABLET | Refills: 2 | Status: SHIPPED | OUTPATIENT
Start: 2022-10-21 | End: 2022-12-21

## 2022-10-21 NOTE — PROGRESS NOTES
"Outpatient Psychiatry Follow-up Visit (MD/NP)    10/21/2022    Cony Lora, a 27 y.o. female, presenting for follow-up visit. Met with patient.    Reason for Encounter: Patient complains of anxiety.    Interval History: Patient seen & interviewed, last seen about 2 months ago. Describes return of sleep problems,characteristic chronic intermittent sleep problems. Bf's mother  of complications of brain CA. Coping ok. No new health problems. No new medications.     Previous trials:    Sertraline (mood symptoms worsened despite it).   Duloxetine (worked for awhile then stopped)  Escitalopram.   Wellbutrin    Sleep:   Temazepam  Zolpidem  Trazodone      Background: Pt is a 24 y/o F who presents for establishment of care, referred by Dr. Kennedy, seen for psychotherapy by Javier Cardoso starting in July. Reports anxiety symptoms back to middle school, probably 7th grade, starting with social anxiety, worrying about others' judgement. Symptoms have gotten worse, more pervasive, & with concern for personal safety, meta-anxiety about anxiety, avoidance. Denies history of jovani trauma. Says that she "doesn't go anywhere anymore", dropped out of [employer] due to anxiety. has lost or quit multiple jobs due to symptoms/ attendance. Understands nature of her fear is exaggerated. Has taken xanax (remote) from Dr. Laron Schreiber about 5 days/week. Was prescribed wellbutrin by Dr. Kennedy, but just managed two doses before self-discontinuing due to intolerable side effects (tingling).      Started therapy last year. Interrupted by insurance loss, then back. Thinks therapy helps. Xanax helps when she has a panic attack. Sometimes able to abort prior to attacks by deep breathing and self-talk. Depression since about age 16 after a bad relationship.     Past meds: buprop - side effects.   Sertraline - more depressed.     FamHx: mom, dad, sister - anxiety. "but I'm the worst".     Psych Hx: as above. Attempts at work have been unsuccessful " "due to "doubt myself", panic attacks, avoidance.     Social Hx: see below. Lives with boyfriend and his brother and sister-in-law.    Moved back from [place].   Cheated on BF in April. "trying to work it out".   Health is ok. No new health problems.   Started new therapy. To go back next week.   No new illnesses.   Relationship with most family is ok. Problems with dad ongoing.   Stayed with family for some time before reconciling with boyfriend.          Supportive relationship. No maltreatment. Good relationship with mom, 2 brothers, "so-so with sister", not much with dad (who has been quite critical; dad also doesn't approve of her bf who is black. Dad is also an alcoholic).     HPI: 21 y/o WF presented for initial evaluation, referred by Hilda Kennedy MD, in Internal Medicine, with chief complaint of chronic generalized anxiety, sleep deficits, frequent tearfulness, 40 lbs weight gain over approximately the past year. Reporting difficulty following through to complete tasks but considers it laziness & procrastination, not particularly focus difficulty. Endorsed difficulty with motivation with many tasks, as long as she can recall. Hated school both socially & academically. Pt described distinct family history of anxiety in both parents & sister. Pt stated she dropped out of the 11th grade secondary to anxiety & quickly obtained her GED. Reported stress in family from other's alcohol use problems & his negativity & intrusiveness when under the influence. Pt said she started to distance herself as a teen.  Reported also a lot of racial tension as patient has dated inter-racially & father angrily disapproved and been insulting. She has been in her current committed relationship the past 5 years, & her boyfriend is . Pt said she has been unable to maintain employment due to her anxiety, bailing out of jobs as a result of insecurity on new jobs. She said she is supported by her mother & boyfriend. " Semi-active Restorationism but will watch services on TV rather than face people live in [place] services. Pt denied any si/hi, psychosis, cognitive deficit, rages, mood swings, or substance use problems. Identified therapeutic goals include reducing anxiety & improving coping skills for anxiety and interpersonal conflict.      Symptoms:   Mood: tearfulness and poor motivation  Anxiety: excessive anxiety/worry and social phobia, decreased sleep  Substance abuse: denied  Cognitive functioning: denied  Health behaviors: noncontributory     Psychiatric history: psychotropic management by PCP     Medical history: 40 pounds weight gain in the past year; currently moderately obese; kidney stones; denied any other physical/medical conditions     Family history of psychiatric illness: Mother, father, and sister all markedly anxious; father also with chronic alcohol use problems. Paternal grandfather also reported to be alcoholic.     Social history: Grew up in , the youngest of 4 siblings; has two brothers & a sister. Raised by both parents who remain together. Pt reported father's alcohol abuse led to many conflicts & tensions in the family home; pt feeling undermined, judged, not at all affirmed by him. She reported hating school, due in significant part to her anxiety. Reports chronic fear of judgment & inadequacy; impulse is to avoid social situations. Described father as having always been negative & judgmental as long as she can remember. Said father's mother was also the same way, very negative, prejudiced, & controlling. Pt very aware of racist/racially intollerant attitudes in her father & paternal grandparents. Reports she dropped out of school & promptly obtained her GED at age 15. Reported 2 committed relationships, the 1st one ending at age 17; said it was a bad relationship. Has been with current BF the past 5 years; describing him as supportive & understanding  Reported both relationships have been interracial,  evoking much conflict with her father & his side of the family. She has just avoided him as much as possible. Pt unemployed, feeling unable to tolerate the social aspect of work settings. Reports being financially supported by boyfriend & her mother. No children; never . No  experience.       Substance use: Alcohol: infrequent   Drugs: sporadic marijuana use from age 13 to current; last reported use August 17th.   Tobacco: former smoker age 13 to 18   Caffeine: none    Review Of Systems:     GENERAL:  No weight gain or loss  SKIN:  No rashes or lacerations  HEAD:  No headaches  CHEST:  No shortness of breath, hyperventilation or cough  CARDIOVASCULAR:  No tachycardia or chest pain  ABDOMEN:  No nausea, vomiting, pain, constipation or diarrhea  URINARY:  No frequency, dysuria or sexual dysfunction  ENDOCRINE:  No polydipsia, polyuria  MUSCULOSKELETAL:  No pain or stiffness of the joints  NEUROLOGIC:  No weakness, sensory changes, seizures, confusion, memory loss, tremor or other abnormal movements    Current Evaluation:     Nutritional Screening: Considering the patient's height and weight, medications, medical history and preferences, should a referral be made to the dietitian? no    Constitutional  Vitals:  Most recent vital signs, dated less than 90 days prior to this appointment, were not reviewed.       General:  unremarkable, age appropriate     Musculoskeletal  Muscle Strength/Tone:  no tremor, no tic   Gait & Station:  non-ataxic     Psychiatric  Appearance: casually dressed & groomed;   Behavior: calm,   Cooperation: cooperative with assessment  Speech: normal rate, volume, tone  Thought Process: linear, goal-directed  Thought Content: No suicidal or homicidal ideation; no delusions  Affect: anxious  Mood: anxious  Perceptions: No auditory or visual hallucinations  Level of Consciousness: alert throughout interview  Insight: fair  Cognition: Oriented to person, place, time, &  situation  Memory: no apparent deficits to general clinical interview; not formally assessed  Attention/Concentration: no apparent deficits to general clinical interview; not formally assessed  Fund of Knowledge: average by vocabulary/education    Laboratory Data  No visits with results within 1 Month(s) from this visit.   Latest known visit with results is:   Office Visit on 06/23/2021   Component Date Value Ref Range Status    Final Pathologic Diagnosis 06/23/2021    Final                    Value:Specimen Adequacy  Satisfactory for interpretation. Endocervical component is present.  [place] Category  Negative for intraepithelial lesion or malignancy.  Inflammation and bacteria present.      Disclaimer 06/23/2021    Final                    Value:The Pap smear is a screening test that aids in the detection of cervical  cancer and cancer precursors. Both false positive and false negative results  can occur. The test should be used at regular intervals, and positive results  should be confirmed before definitive therapy.  This liquid based specimen is processed using the  or  Thin PrepPAP  System. This specimen has been analyzed by the ThinPrep Imaging System  ([employer]), an automated imaging and review system which assists  the laboratory in evaluating cells on ThinPrep PAP tests. Following automated  imaging, selected fields from every slide are reviewed by a cytotechnologist  and/or pathologist.  Screening was performed at [facility], [address] [place] [address].      Trichomonas vaginalis 06/23/2021 Negative  Negative Final    Candida sp 06/23/2021 Negative  Negative Final    Kay glabrata DNA 06/23/2021 Negative  Negative Final    Kay krusei DNA 06/23/2021 Negative  Negative Final    Bacterial vaginosis DNA 06/23/2021 Positive (A)  Negative Final     Medications  Outpatient Encounter Medications as of 10/21/2022   Medication  Sig Dispense Refill    venlafaxine (EFFEXOR-XR) 75 MG 24 hr capsule Take 1 capsule daily for 7 days then 2 daily thereafter. 60 capsule 2    venlafaxine (EFFEXOR-XR) 75 MG 24 hr capsule TAKE 3 CAPSULES BY MOUTH DAILY 90 capsule 2     No facility-administered encounter medications on file as of 10/21/2022.     Assessment - Diagnosis - Goals:     Impression: 28 y/o F with hx of severe and pervasive anxiety, dysfunction in multiple life roles including occupational dysfunction. Has had limited previous treatment, modest benefits from xanax and psychotherapy. Some benefits from duloxetine, but ongoing symptoms, no better response at higher doses. Insufficient response to escitalopram. Ongoing sleep symptoms. Ongoing stressors, feeling and coping somewhat better.     Dx: MDD, Generalized anxiety disorder, insomnia    Treatment Goals:  Specify outcomes written in observable, behavioral terms: reduce symptoms by self-reports and scales. Improve functioning.     Treatment Plan/Recommendations:   Venlafaxine 150 mg daily. Xanax prn.   Trazodone.  Discussed risks, benefits, and alternatives to treatment plan documented above with patient. I answered all patient questions related to this plan and patient expressed understanding and agreement.     Return to Clinic: mt Ga MD  Psychiatry  [facility]  [address] [facility] [facility] [address] [facility] , [place] [address] [phone]

## 2022-12-21 ENCOUNTER — OFFICE VISIT (OUTPATIENT)
Dept: PSYCHIATRY | Facility: CLINIC | Age: 27
End: 2022-12-21
Payer: MEDICAID

## 2022-12-21 VITALS
BODY MASS INDEX: 25.76 KG/M2 | DIASTOLIC BLOOD PRESSURE: 82 MMHG | SYSTOLIC BLOOD PRESSURE: 120 MMHG | WEIGHT: 164.44 LBS | HEART RATE: 92 BPM

## 2022-12-21 DIAGNOSIS — F41.1 GAD (GENERALIZED ANXIETY DISORDER): Primary | ICD-10-CM

## 2022-12-21 DIAGNOSIS — F33.41 MDD (MAJOR DEPRESSIVE DISORDER), RECURRENT, IN PARTIAL REMISSION: ICD-10-CM

## 2022-12-21 PROCEDURE — 3074F SYST BP LT 130 MM HG: CPT | Mod: AF,HB,CPTII, | Performed by: PSYCHIATRY & NEUROLOGY

## 2022-12-21 PROCEDURE — 99214 PR OFFICE/OUTPT VISIT, EST, LEVL IV, 30-39 MIN: ICD-10-PCS | Mod: S$PBB,AF,HB, | Performed by: PSYCHIATRY & NEUROLOGY

## 2022-12-21 PROCEDURE — 99212 OFFICE O/P EST SF 10 MIN: CPT | Mod: PBBFAC | Performed by: PSYCHIATRY & NEUROLOGY

## 2022-12-21 PROCEDURE — 3074F PR MOST RECENT SYSTOLIC BLOOD PRESSURE < 130 MM HG: ICD-10-PCS | Mod: AF,HB,CPTII, | Performed by: PSYCHIATRY & NEUROLOGY

## 2022-12-21 PROCEDURE — 3079F DIAST BP 80-89 MM HG: CPT | Mod: AF,HB,CPTII, | Performed by: PSYCHIATRY & NEUROLOGY

## 2022-12-21 PROCEDURE — 99999 PR PBB SHADOW E&M-EST. PATIENT-LVL II: CPT | Mod: PBBFAC,AF,HB, | Performed by: PSYCHIATRY & NEUROLOGY

## 2022-12-21 PROCEDURE — 3008F PR BODY MASS INDEX (BMI) DOCUMENTED: ICD-10-PCS | Mod: AF,HB,CPTII, | Performed by: PSYCHIATRY & NEUROLOGY

## 2022-12-21 PROCEDURE — 3079F PR MOST RECENT DIASTOLIC BLOOD PRESSURE 80-89 MM HG: ICD-10-PCS | Mod: AF,HB,CPTII, | Performed by: PSYCHIATRY & NEUROLOGY

## 2022-12-21 PROCEDURE — 3008F BODY MASS INDEX DOCD: CPT | Mod: AF,HB,CPTII, | Performed by: PSYCHIATRY & NEUROLOGY

## 2022-12-21 PROCEDURE — 99999 PR PBB SHADOW E&M-EST. PATIENT-LVL II: ICD-10-PCS | Mod: PBBFAC,AF,HB, | Performed by: PSYCHIATRY & NEUROLOGY

## 2022-12-21 PROCEDURE — 99214 OFFICE O/P EST MOD 30 MIN: CPT | Mod: S$PBB,AF,HB, | Performed by: PSYCHIATRY & NEUROLOGY

## 2022-12-21 RX ORDER — VENLAFAXINE HYDROCHLORIDE 75 MG/1
CAPSULE, EXTENDED RELEASE ORAL
Qty: 90 CAPSULE | Refills: 3 | Status: SHIPPED | OUTPATIENT
Start: 2022-12-21 | End: 2023-04-21

## 2022-12-21 NOTE — PROGRESS NOTES
"Outpatient Psychiatry Follow-up Visit (MD/NP)    12/21/2022    Cony Lora, a 27 y.o. female, presenting for follow-up visit. Met with patient.    Reason for Encounter: Patient complains of anxiety.    Interval History: Patient seen & interviewed, last seen about two months ago. Describes improvement in sleep since last visit. Moods recently have been mostly ok. Migraines, vertigo - had MRI, sees neuro. No new illnesses since last visit. No new medications. Adherent to medication, describes good perceived benefit, no side effects. ARMANDO-7 = 0.     Previous trials:    Sertraline (mood symptoms worsened despite it).   Duloxetine (worked for awhile then stopped)  Escitalopram.   Wellbutrin    Sleep:   Temazepam  Zolpidem  Trazodone      Background: Pt is a 22 y/o F who presents for establishment of care, referred by Dr. Kennedy, seen for psychotherapy by Javier Cardoso starting in July. Reports anxiety symptoms back to middle school, probably 7th grade, starting with social anxiety, worrying about others' judgement. Symptoms have gotten worse, more pervasive, & with concern for personal safety, meta-anxiety about anxiety, avoidance. Denies history of jovani trauma. Says that she "doesn't go anywhere anymore", dropped out of  due to anxiety. has lost or quit multiple jobs due to symptoms/ attendance. Understands nature of her fear is exaggerated. Has taken xanax (remote) from Dr. Laron Schreiber about 5 days/week. Was prescribed wellbutrin by Dr. Kennedy, but just managed two doses before self-discontinuing due to intolerable side effects (tingling).      Started therapy last year. Interrupted by insurance loss, then back. Thinks therapy helps. Xanax helps when she has a panic attack. Sometimes able to abort prior to attacks by deep breathing and self-talk. Depression since about age 16 after a bad relationship.     Past meds: buprop - side effects.   Sertraline - more depressed.     FamHx: mom, dad, sister - anxiety. "but " "I'm the worst".     Psych Hx: as above. Attempts at work have been unsuccessful due to "doubt myself", panic attacks, avoidance.     Social Hx: see below. Lives with boyfriend and his brother and sister-in-law.    Moved back from [place].   Cheated on BF in April. "trying to work it out".   Health is ok. No new health problems.   Started new therapy. To go back next week.   No new illnesses.   Relationship with most family is ok. Problems with dad ongoing.   Stayed with family for some time before reconciling with boyfriend.          Supportive relationship. No maltreatment. Good relationship with mom, 2 brothers, "so-so with sister", not much with dad (who has been quite critical; dad also doesn't approve of her bf who is black. Dad is also an alcoholic).     HPI: 21 y/o WF presented for initial evaluation, referred by Hilda Kennedy MD, in Internal Medicine, with chief complaint of chronic generalized anxiety, sleep deficits, frequent tearfulness, 40 lbs weight gain over approximately the past year. Reporting difficulty following through to complete tasks but considers it laziness & procrastination, not particularly focus difficulty. Endorsed difficulty with motivation with many tasks, as long as she can recall. Hated school both socially & academically. Pt described distinct family history of anxiety in both parents & sister. Pt stated she dropped out of the 11th grade secondary to anxiety & quickly obtained her GED. Reported stress in family from other's alcohol use problems & his negativity & intrusiveness when under the influence. Pt said she started to distance herself as a teen.  Reported also a lot of racial tension as patient has dated inter-racially & father angrily disapproved and been insulting. She has been in her current committed relationship the past 5 years, & her boyfriend is . Pt said she has been unable to maintain employment due to her anxiety, bailing out of jobs as a result of " insecurity on new jobs. She said she is supported by her mother & boyfriend. Semi-active [employer] but will watch services on TV rather than face people live in [employer] services. Pt denied any si/hi, psychosis, cognitive deficit, rages, mood swings, or substance use problems. Identified therapeutic goals include reducing anxiety & improving coping skills for anxiety and interpersonal conflict.      Symptoms:   Mood: tearfulness and poor motivation  Anxiety: excessive anxiety/worry and social phobia, decreased sleep  Substance abuse: denied  Cognitive functioning: denied  Health behaviors: noncontributory     Psychiatric history: psychotropic management by PCP     Medical history: 40 pounds weight gain in the past year; currently moderately obese; kidney stones; denied any other physical/medical conditions     Family history of psychiatric illness: Mother, father, and sister all markedly anxious; father also with chronic alcohol use problems. Paternal grandfather also reported to be alcoholic.     Social history: Grew up in , the youngest of 4 siblings; has two brothers & a sister. Raised by both parents who remain together. Pt reported father's alcohol abuse led to many conflicts & tensions in the family home; pt feeling undermined, judged, not at all affirmed by him. She reported hating school, due in significant part to her anxiety. Reports chronic fear of judgment & inadequacy; impulse is to avoid social situations. Described father as having always been negative & judgmental as long as she can remember. Said father's mother was also the same way, very negative, prejudiced, & controlling. Pt very aware of racist/racially intollerant attitudes in her father & paternal grandparents. Reports she dropped out of school & promptly obtained her GED at age 15. Reported 2 committed relationships, the 1st one ending at age 17; said it was a bad relationship. Has been with current BF the past 5 years; describing him as  "supportive & understanding  Reported both relationships have been interracial, evoking much conflict with her father & his side of the family. She has just avoided him as much as possible. Pt unemployed, feeling unable to tolerate the social aspect of work settings. Reports being financially supported by boyfriend & her mother. No children; never . No  experience.       Substance use: Alcohol: infrequent   Drugs: sporadic marijuana use from age 13 to current; last reported use August 17th.   Tobacco: former smoker age 13 to 18   Caffeine: none    Review Of Systems:     GENERAL:  No weight gain or loss  SKIN:  No rashes or lacerations  HEAD:  No headaches  CHEST:  No shortness of breath, hyperventilation or cough  CARDIOVASCULAR:  No tachycardia or chest pain  ABDOMEN:  No nausea, vomiting, pain, constipation or diarrhea  URINARY:  No frequency, dysuria or sexual dysfunction  ENDOCRINE:  No polydipsia, polyuria  MUSCULOSKELETAL:  No pain or stiffness of the joints  NEUROLOGIC:  No weakness, sensory changes, seizures, confusion, memory loss, tremor or other abnormal movements    Current Evaluation:     Nutritional Screening: Considering the patient's height and weight, medications, medical history and preferences, should a referral be made to the dietitian? no    Constitutional  Vitals:  Most recent vital signs, dated less than 90 days prior to this appointment, were not reviewed.       General:  unremarkable, age appropriate     Musculoskeletal  Muscle Strength/Tone:  no tremor, no tic   Gait & Station:  non-ataxic     Psychiatric  Appearance: casually dressed & groomed;   Behavior: calm,   Cooperation: cooperative with assessment  Speech: normal rate, volume, tone  Thought Process: linear, goal-directed  Thought Content: No suicidal or homicidal ideation; no delusions  Affect: reactive  Mood: "ok"  Perceptions: No auditory or visual hallucinations  Level of Consciousness: alert throughout " interview  Insight: fair  Cognition: Oriented to person, place, time, & situation  Memory: no apparent deficits to general clinical interview; not formally assessed  Attention/Concentration: no apparent deficits to general clinical interview; not formally assessed  Fund of Knowledge: average by vocabulary/education    Laboratory Data  No visits with results within 1 Month(s) from this visit.   Latest known visit with results is:   Office Visit on 06/23/2021   Component Date Value Ref Range Status    Final Pathologic Diagnosis 06/23/2021    Final                    Value:Specimen Adequacy  Satisfactory for interpretation. Endocervical component is present.  [place] Category  Negative for intraepithelial lesion or malignancy.  Inflammation and bacteria present.      Disclaimer 06/23/2021    Final                    Value:The Pap smear is a screening test that aids in the detection of cervical  cancer and cancer precursors. Both false positive and false negative results  can occur. The test should be used at regular intervals, and positive results  should be confirmed before definitive therapy.  This liquid based specimen is processed using the  or  Thin PrepPAP  System. This specimen has been analyzed by the ThinPrep Imaging System  ([employer]), an automated imaging and review system which assists  the laboratory in evaluating cells on ThinPrep PAP tests. Following automated  imaging, selected fields from every slide are reviewed by a cytotechnologist  and/or pathologist.  Screening was performed at [facility], [address], [place] [address].      Trichomonas vaginalis 06/23/2021 Negative  Negative Final    Candida sp 06/23/2021 Negative  Negative Final    Kay glabrata DNA 06/23/2021 Negative  Negative Final    Kay krusei DNA 06/23/2021 Negative  Negative Final    Bacterial vaginosis DNA 06/23/2021 Positive (A)  Negative Final      Medications  Outpatient Encounter Medications as of 12/21/2022   Medication Sig Dispense Refill    traZODone (DESYREL) 100 MG tablet Take 1 tablet (100 mg total) by mouth every evening. 60 tablet 2    venlafaxine (EFFEXOR-XR) 75 MG 24 hr capsule Take 1 capsule daily for 7 days then 2 daily thereafter. 60 capsule 2    venlafaxine (EFFEXOR-XR) 75 MG 24 hr capsule TAKE 3 CAPSULES BY MOUTH DAILY 90 capsule 2     No facility-administered encounter medications on file as of 12/21/2022.     Assessment - Diagnosis - Goals:     Impression: 26 y/o F with hx of severe and pervasive anxiety, dysfunction in multiple life roles including occupational dysfunction. Has had limited previous treatment, modest benefits from xanax and psychotherapy. Some benefits from duloxetine, but ongoing symptoms, no better response at higher doses. Insufficient response to escitalopram. Stressors improved. Symptoms down, sleeping better.     Dx: MDD, Generalized anxiety disorder, insomnia    Treatment Goals:  Specify outcomes written in observable, behavioral terms: reduce symptoms by self-reports and scales. Improve functioning.     Treatment Plan/Recommendations:   Venlafaxine 225 mg daily. Xanax prn.   Trazodone prn.  Discussed risks, benefits, and alternatives to treatment plan documented above with patient. I answered all patient questions related to this plan and patient expressed understanding and agreement.     Return to Clinic: mt Ga MD  Psychiatry  [facility]  [address] , [place] [address]  [phone]

## 2023-04-21 ENCOUNTER — OFFICE VISIT (OUTPATIENT)
Dept: PSYCHIATRY | Facility: CLINIC | Age: 28
End: 2023-04-21
Payer: MEDICAID

## 2023-04-21 VITALS
WEIGHT: 159.19 LBS | HEART RATE: 105 BPM | SYSTOLIC BLOOD PRESSURE: 120 MMHG | DIASTOLIC BLOOD PRESSURE: 88 MMHG | BODY MASS INDEX: 24.93 KG/M2

## 2023-04-21 DIAGNOSIS — F41.1 GAD (GENERALIZED ANXIETY DISORDER): Primary | ICD-10-CM

## 2023-04-21 DIAGNOSIS — F33.41 MDD (MAJOR DEPRESSIVE DISORDER), RECURRENT, IN PARTIAL REMISSION: ICD-10-CM

## 2023-04-21 PROCEDURE — 1159F PR MEDICATION LIST DOCUMENTED IN MEDICAL RECORD: ICD-10-PCS | Mod: AF,HB,CPTII, | Performed by: PSYCHIATRY & NEUROLOGY

## 2023-04-21 PROCEDURE — 99999 PR PBB SHADOW E&M-EST. PATIENT-LVL II: CPT | Mod: PBBFAC,AF,HB, | Performed by: PSYCHIATRY & NEUROLOGY

## 2023-04-21 PROCEDURE — 3074F PR MOST RECENT SYSTOLIC BLOOD PRESSURE < 130 MM HG: ICD-10-PCS | Mod: AF,HB,CPTII, | Performed by: PSYCHIATRY & NEUROLOGY

## 2023-04-21 PROCEDURE — 99999 PR PBB SHADOW E&M-EST. PATIENT-LVL II: ICD-10-PCS | Mod: PBBFAC,AF,HB, | Performed by: PSYCHIATRY & NEUROLOGY

## 2023-04-21 PROCEDURE — 3074F SYST BP LT 130 MM HG: CPT | Mod: AF,HB,CPTII, | Performed by: PSYCHIATRY & NEUROLOGY

## 2023-04-21 PROCEDURE — 3079F DIAST BP 80-89 MM HG: CPT | Mod: AF,HB,CPTII, | Performed by: PSYCHIATRY & NEUROLOGY

## 2023-04-21 PROCEDURE — 99212 OFFICE O/P EST SF 10 MIN: CPT | Mod: PBBFAC | Performed by: PSYCHIATRY & NEUROLOGY

## 2023-04-21 PROCEDURE — 3079F PR MOST RECENT DIASTOLIC BLOOD PRESSURE 80-89 MM HG: ICD-10-PCS | Mod: AF,HB,CPTII, | Performed by: PSYCHIATRY & NEUROLOGY

## 2023-04-21 PROCEDURE — 3008F PR BODY MASS INDEX (BMI) DOCUMENTED: ICD-10-PCS | Mod: AF,HB,CPTII, | Performed by: PSYCHIATRY & NEUROLOGY

## 2023-04-21 PROCEDURE — 1160F RVW MEDS BY RX/DR IN RCRD: CPT | Mod: AF,HB,CPTII, | Performed by: PSYCHIATRY & NEUROLOGY

## 2023-04-21 PROCEDURE — 1160F PR REVIEW ALL MEDS BY PRESCRIBER/CLIN PHARMACIST DOCUMENTED: ICD-10-PCS | Mod: AF,HB,CPTII, | Performed by: PSYCHIATRY & NEUROLOGY

## 2023-04-21 PROCEDURE — 99214 OFFICE O/P EST MOD 30 MIN: CPT | Mod: S$PBB,AF,HB, | Performed by: PSYCHIATRY & NEUROLOGY

## 2023-04-21 PROCEDURE — 1159F MED LIST DOCD IN RCRD: CPT | Mod: AF,HB,CPTII, | Performed by: PSYCHIATRY & NEUROLOGY

## 2023-04-21 PROCEDURE — 99214 PR OFFICE/OUTPT VISIT, EST, LEVL IV, 30-39 MIN: ICD-10-PCS | Mod: S$PBB,AF,HB, | Performed by: PSYCHIATRY & NEUROLOGY

## 2023-04-21 PROCEDURE — 3008F BODY MASS INDEX DOCD: CPT | Mod: AF,HB,CPTII, | Performed by: PSYCHIATRY & NEUROLOGY

## 2023-04-21 RX ORDER — FLUOXETINE HYDROCHLORIDE 40 MG/1
40 CAPSULE ORAL DAILY
Qty: 30 CAPSULE | Refills: 3 | Status: SHIPPED | OUTPATIENT
Start: 2023-04-21 | End: 2023-07-25

## 2023-04-21 RX ORDER — VENLAFAXINE HYDROCHLORIDE 150 MG/1
150 CAPSULE, EXTENDED RELEASE ORAL DAILY
Qty: 30 CAPSULE | Refills: 3 | Status: SHIPPED | OUTPATIENT
Start: 2023-04-21 | End: 2023-07-25

## 2023-04-21 NOTE — PROGRESS NOTES
"Outpatient Psychiatry Follow-up Visit (MD/NP)    4/21/2023    Cony Lora, a 28 y.o. female, presenting for follow-up visit. Met with patient.    Reason for Encounter: Patient complains of anxiety.    Interval History: Patient seen & interviewed, last seen about two months ago. Describes improvement in sleep since last visit. Moods recently have been mostly ok.     Had vertigo in December, a recurrence in January & periodically since. Unclear cause. A little anxious.   To work from home. No new or different meds.   Migraines improved. Adherent to medications.   Moods up and down. Some irritability. Perhaps worse than previously. Helps to withdraw.       Migraines, vertigo - had MRI, sees neuro. No new illnesses since last visit. No new medications. Adherent to medication, describes good perceived benefit, no side effects. ARMANDO-7 = 0.     Previous trials:    Sertraline (mood symptoms worsened despite it).   Duloxetine (worked for awhile then stopped)  Escitalopram.   Wellbutrin    Sleep:   Temazepam  Zolpidem  Trazodone      Background: Pt is a 24 y/o F who presents for establishment of care, referred by Dr. Kennedy, seen for psychotherapy by Javier Cardoso starting in July. Reports anxiety symptoms back to middle school, probably 7th grade, starting with social anxiety, worrying about others' judgement. Symptoms have gotten worse, more pervasive, & with concern for personal safety, meta-anxiety about anxiety, avoidance. Denies history of jovani trauma. Says that she "doesn't go anywhere anymore", dropped out of  due to anxiety. has lost or quit multiple jobs due to symptoms/ attendance. Understands nature of her fear is exaggerated. Has taken xanax (remote) from Dr. Laron Schreiber about 5 days/week. Was prescribed wellbutrin by Dr. Kennedy, but just managed two doses before self-discontinuing due to intolerable side effects (tingling).      Started therapy last year. Interrupted by insurance loss, then back. Thinks " "therapy helps. Xanax helps when she has a panic attack. Sometimes able to abort prior to attacks by deep breathing and self-talk. Depression since about age 16 after a bad relationship.     Past meds: buprop - side effects.   Sertraline - more depressed.     FamHx: mom, dad, sister - anxiety. "but I'm the worst".     Psych Hx: as above. Attempts at work have been unsuccessful due to "doubt myself", panic attacks, avoidance.     Social Hx: see below. Lives with boyfriend and his brother and sister-in-law.    Moved back from [place].   Cheated on BF in April. "trying to work it out".   Health is ok. No new health problems.   Started new therapy. To go back next week.   No new illnesses.   Relationship with most family is ok. Problems with dad ongoing.   Stayed with family for some time before reconciling with boyfriend.          Supportive relationship. No maltreatment. Good relationship with mom, 2 brothers, "so-so with sister", not much with dad (who has been quite critical; dad also doesn't approve of her bf who is black. Dad is also an alcoholic).     HPI: 21 y/o WF presented for initial evaluation, referred by Hilda Kennedy MD, in Internal Medicine, with chief complaint of chronic generalized anxiety, sleep deficits, frequent tearfulness, 40 lbs weight gain over approximately the past year. Reporting difficulty following through to complete tasks but considers it laziness & procrastination, not particularly focus difficulty. Endorsed difficulty with motivation with many tasks, as long as she can recall. Hated school both socially & academically. Pt described distinct family history of anxiety in both parents & sister. Pt stated she dropped out of the 11th grade secondary to anxiety & quickly obtained her GED. Reported stress in family from other's alcohol use problems & his negativity & intrusiveness when under the influence. Pt said she started to distance herself as a teen.  Reported also a lot of racial " tension as patient has dated inter-racially & father angrily disapproved and been insulting. She has been in her current committed relationship the past 5 years, & her boyfriend is . Pt said she has been unable to maintain employment due to her anxiety, bailing out of jobs as a result of insecurity on new jobs. She said she is supported by her mother & boyfriend. Semi-active L[employer] but will watch services on TV rather than face people live in Restoration[employer] services. Pt denied any si/hi, psychosis, cognitive deficit, rages, mood swings, or substance use problems. Identified therapeutic goals include reducing anxiety & improving coping skills for anxiety and interpersonal conflict.      Symptoms:   Mood: tearfulness and poor motivation  Anxiety: excessive anxiety/worry and social phobia, decreased sleep  Substance abuse: denied  Cognitive functioning: denied  Health behaviors: noncontributory     Psychiatric history: psychotropic management by PCP     Medical history: 40 pounds weight gain in the past year; currently moderately obese; kidney stones; denied any other physical/medical conditions     Family history of psychiatric illness: Mother, father, and sister all markedly anxious; father also with chronic alcohol use problems. Paternal grandfather also reported to be alcoholic.     Social history: Grew up in , the youngest of 4 siblings; has two brothers & a sister. Raised by both parents who remain together. Pt reported father's alcohol abuse led to many conflicts & tensions in the family home; pt feeling undermined, judged, not at all affirmed by him. She reported hating school, due in significant part to her anxiety. Reports chronic fear of judgment & inadequacy; impulse is to avoid social situations. Described father as having always been negative & judgmental as long as she can remember. Said father's mother was also the same way, very negative, prejudiced, & controlling. Pt very aware of  racist/racially intollerant attitudes in her father & paternal grandparents. Reports she dropped out of school & promptly obtained her GED at age 15. Reported 2 committed relationships, the 1st one ending at age 17; said it was a bad relationship. Has been with current BF the past 5 years; describing him as supportive & understanding  Reported both relationships have been interracial, evoking much conflict with her father & his side of the family. She has just avoided him as much as possible. Pt unemployed, feeling unable to tolerate the social aspect of work settings. Reports being financially supported by boyfriend & her mother. No children; never . No  experience.       Substance use: Alcohol: infrequent   Drugs: sporadic marijuana use from age 13 to current; last reported use August 17th.   Tobacco: former smoker age 13 to 18   Caffeine: none    Review Of Systems:     GENERAL:  No weight gain or loss  SKIN:  No rashes or lacerations  HEAD:  No headaches  CHEST:  No shortness of breath, hyperventilation or cough  CARDIOVASCULAR:  No tachycardia or chest pain  ABDOMEN:  No nausea, vomiting, pain, constipation or diarrhea  URINARY:  No frequency, dysuria or sexual dysfunction  ENDOCRINE:  No polydipsia, polyuria  MUSCULOSKELETAL:  No pain or stiffness of the joints  NEUROLOGIC:  No weakness, sensory changes, seizures, confusion, memory loss, tremor or other abnormal movements    Current Evaluation:     Nutritional Screening: Considering the patient's height and weight, medications, medical history and preferences, should a referral be made to the dietitian? no    Constitutional  Vitals:  Most recent vital signs, dated less than 90 days prior to this appointment, were not reviewed.       General:  unremarkable, age appropriate     Musculoskeletal  Muscle Strength/Tone:  no tremor, no tic   Gait & Station:  non-ataxic     Psychiatric  Appearance: casually dressed & groomed;   Behavior: calm,  "  Cooperation: cooperative with assessment  Speech: normal rate, volume, tone  Thought Process: linear, goal-directed  Thought Content: No suicidal or homicidal ideation; no delusions  Affect: reactive  Mood: "ok"  Perceptions: No auditory or visual hallucinations  Level of Consciousness: alert throughout interview  Insight: fair  Cognition: Oriented to person, place, time, & situation  Memory: no apparent deficits to general clinical interview; not formally assessed  Attention/Concentration: no apparent deficits to general clinical interview; not formally assessed  Fund of Knowledge: average by vocabulary/education    Laboratory Data  No visits with results within 1 Month(s) from this visit.   Latest known visit with results is:   Office Visit on 06/23/2021   Component Date Value Ref Range Status    Final Pathologic Diagnosis 06/23/2021    Final                    Value:Specimen Adequacy  Satisfactory for interpretation. Endocervical component is present.  [place] Category  Negative for intraepithelial lesion or malignancy.  Inflammation and bacteria present.      Disclaimer 06/23/2021    Final                    Value:The Pap smear is a screening test that aids in the detection of cervical  cancer and cancer precursors. Both false positive and false negative results  can occur. The test should be used at regular intervals, and positive results  should be confirmed before definitive therapy.  This liquid based specimen is processed using the  or  Thin PrepPAP  System. This specimen has been analyzed by the ThinPrep Imaging System  ([employer] ([employer])), an automated imaging and review system which assists  the laboratory in evaluating cells on ThinPrep PAP tests. Following automated  imaging, selected fields from every slide are reviewed by a cytotechnologist  and/or pathologist.  Screening was performed at [facility], [address] [place] " [id number].      Trichomonas vaginalis 06/23/2021 Negative  Negative Final    Candida sp 06/23/2021 Negative  Negative Final    Kay glabrata DNA 06/23/2021 Negative  Negative Final    Kay krusei DNA 06/23/2021 Negative  Negative Final    Bacterial vaginosis DNA 06/23/2021 Positive (A)  Negative Final     Medications  Outpatient Encounter Medications as of 4/21/2023   Medication Sig Dispense Refill    venlafaxine (EFFEXOR-XR) 75 MG 24 hr capsule TAKE 3 CAPSULES BY MOUTH DAILY 90 capsule 3     No facility-administered encounter medications on file as of 4/21/2023.     Assessment - Diagnosis - Goals:     Impression: 28 y/o F with hx of severe and pervasive anxiety, dysfunction in multiple life roles including occupational dysfunction. Has had limited previous treatment, modest benefits from xanax and psychotherapy. Some benefits from duloxetine, but ongoing symptoms, no better response at higher doses. Insufficient response to escitalopram. Stressors improved. Symptoms a little more anxious.     Dx: MDD, Generalized anxiety disorder, insomnia    Treatment Goals:  Specify outcomes written in observable, behavioral terms: reduce symptoms by self-reports and scales. Improve functioning.     Treatment Plan/Recommendations:   Start fluoxetine. Taper Venlafaxine, discontinue. Xanax prn.   Trazodone prn.  Discussed risks, benefits, and alternatives to treatment plan documented above with patient. I answered all patient questions related to this plan and patient expressed understanding and agreement.     Return to Clinic: mt Ga MD  Psychiatry  [facility]  [address] [place] , [place] [address]  [phone]

## 2023-07-25 ENCOUNTER — OFFICE VISIT (OUTPATIENT)
Dept: PSYCHIATRY | Facility: CLINIC | Age: 28
End: 2023-07-25
Payer: MEDICAID

## 2023-07-25 VITALS
HEART RATE: 103 BPM | DIASTOLIC BLOOD PRESSURE: 71 MMHG | WEIGHT: 161.19 LBS | SYSTOLIC BLOOD PRESSURE: 119 MMHG | BODY MASS INDEX: 25.24 KG/M2

## 2023-07-25 DIAGNOSIS — G47.9 DISTURBANCE IN SLEEP BEHAVIOR: Primary | ICD-10-CM

## 2023-07-25 DIAGNOSIS — G47.00 INSOMNIA, UNSPECIFIED TYPE: ICD-10-CM

## 2023-07-25 DIAGNOSIS — F41.1 GAD (GENERALIZED ANXIETY DISORDER): ICD-10-CM

## 2023-07-25 PROCEDURE — 3008F BODY MASS INDEX DOCD: CPT | Mod: AF,HB,CPTII, | Performed by: PSYCHIATRY & NEUROLOGY

## 2023-07-25 PROCEDURE — 3078F DIAST BP <80 MM HG: CPT | Mod: AF,HB,CPTII, | Performed by: PSYCHIATRY & NEUROLOGY

## 2023-07-25 PROCEDURE — 99999 PR PBB SHADOW E&M-EST. PATIENT-LVL II: CPT | Mod: PBBFAC,AF,HB, | Performed by: PSYCHIATRY & NEUROLOGY

## 2023-07-25 PROCEDURE — 99214 OFFICE O/P EST MOD 30 MIN: CPT | Mod: S$PBB,AF,HB, | Performed by: PSYCHIATRY & NEUROLOGY

## 2023-07-25 PROCEDURE — 99999 PR PBB SHADOW E&M-EST. PATIENT-LVL II: ICD-10-PCS | Mod: PBBFAC,AF,HB, | Performed by: PSYCHIATRY & NEUROLOGY

## 2023-07-25 PROCEDURE — 99214 PR OFFICE/OUTPT VISIT, EST, LEVL IV, 30-39 MIN: ICD-10-PCS | Mod: S$PBB,AF,HB, | Performed by: PSYCHIATRY & NEUROLOGY

## 2023-07-25 PROCEDURE — 3078F PR MOST RECENT DIASTOLIC BLOOD PRESSURE < 80 MM HG: ICD-10-PCS | Mod: AF,HB,CPTII, | Performed by: PSYCHIATRY & NEUROLOGY

## 2023-07-25 PROCEDURE — 3008F PR BODY MASS INDEX (BMI) DOCUMENTED: ICD-10-PCS | Mod: AF,HB,CPTII, | Performed by: PSYCHIATRY & NEUROLOGY

## 2023-07-25 PROCEDURE — 99212 OFFICE O/P EST SF 10 MIN: CPT | Mod: PBBFAC | Performed by: PSYCHIATRY & NEUROLOGY

## 2023-07-25 PROCEDURE — 3074F PR MOST RECENT SYSTOLIC BLOOD PRESSURE < 130 MM HG: ICD-10-PCS | Mod: AF,HB,CPTII, | Performed by: PSYCHIATRY & NEUROLOGY

## 2023-07-25 PROCEDURE — 3074F SYST BP LT 130 MM HG: CPT | Mod: AF,HB,CPTII, | Performed by: PSYCHIATRY & NEUROLOGY

## 2023-07-25 RX ORDER — FLUVOXAMINE MALEATE 100 MG/1
TABLET, COATED ORAL
Qty: 30 TABLET | Refills: 2 | Status: SHIPPED | OUTPATIENT
Start: 2023-07-25 | End: 2023-11-27 | Stop reason: SDUPTHER

## 2023-07-25 RX ORDER — VENLAFAXINE HYDROCHLORIDE 37.5 MG/1
CAPSULE, EXTENDED RELEASE ORAL
Qty: 18 CAPSULE | Refills: 0 | Status: SHIPPED | OUTPATIENT
Start: 2023-07-25 | End: 2023-10-27

## 2023-07-31 ENCOUNTER — PATIENT MESSAGE (OUTPATIENT)
Dept: INFECTIOUS DISEASES | Facility: CLINIC | Age: 28
End: 2023-07-31
Payer: MEDICAID

## 2023-09-22 ENCOUNTER — PATIENT MESSAGE (OUTPATIENT)
Dept: PSYCHIATRY | Facility: CLINIC | Age: 28
End: 2023-09-22
Payer: MEDICAID

## 2023-10-27 ENCOUNTER — HOSPITAL ENCOUNTER (OUTPATIENT)
Dept: RADIOLOGY | Facility: HOSPITAL | Age: 28
Discharge: HOME | End: 2023-10-27
Attending: INTERNAL MEDICINE
Payer: MEDICAID

## 2023-10-27 ENCOUNTER — TELEPHONE (OUTPATIENT)
Dept: PULMONOLOGY | Facility: CLINIC | Age: 28
End: 2023-10-27
Payer: MEDICAID

## 2023-10-27 ENCOUNTER — OFFICE VISIT (OUTPATIENT)
Dept: PULMONOLOGY | Facility: CLINIC | Age: 28
End: 2023-10-27
Payer: MEDICAID

## 2023-10-27 VITALS
OXYGEN SATURATION: 98 % | HEART RATE: 82 BPM | WEIGHT: 178.38 LBS | HEIGHT: 67 IN | RESPIRATION RATE: 17 BRPM | SYSTOLIC BLOOD PRESSURE: 120 MMHG | DIASTOLIC BLOOD PRESSURE: 82 MMHG | BODY MASS INDEX: 28 KG/M2

## 2023-10-27 DIAGNOSIS — G47.33 OSA (OBSTRUCTIVE SLEEP APNEA): ICD-10-CM

## 2023-10-27 DIAGNOSIS — G47.30 SLEEP-DISORDERED BREATHING: Primary | ICD-10-CM

## 2023-10-27 DIAGNOSIS — G47.9 DISTURBANCE IN SLEEP BEHAVIOR: ICD-10-CM

## 2023-10-27 DIAGNOSIS — G47.00 INSOMNIA, UNSPECIFIED TYPE: ICD-10-CM

## 2023-10-27 DIAGNOSIS — G47.30 SLEEP-DISORDERED BREATHING: ICD-10-CM

## 2023-10-27 DIAGNOSIS — G47.54 PARASOMNIA IN CONDITIONS CLASSIFIED ELSEWHERE: Primary | ICD-10-CM

## 2023-10-27 PROBLEM — F32.A DEPRESSION: Status: ACTIVE | Noted: 2023-10-27

## 2023-10-27 PROCEDURE — 1159F MED LIST DOCD IN RCRD: CPT | Mod: CPTII,,, | Performed by: INTERNAL MEDICINE

## 2023-10-27 PROCEDURE — 99999 PR PBB SHADOW E&M-EST. PATIENT-LVL IV: ICD-10-PCS | Mod: PBBFAC,,, | Performed by: INTERNAL MEDICINE

## 2023-10-27 PROCEDURE — 99214 OFFICE O/P EST MOD 30 MIN: CPT | Mod: PBBFAC,25 | Performed by: INTERNAL MEDICINE

## 2023-10-27 PROCEDURE — 3074F SYST BP LT 130 MM HG: CPT | Mod: CPTII,,, | Performed by: INTERNAL MEDICINE

## 2023-10-27 PROCEDURE — 71046 XR CHEST PA AND LATERAL: ICD-10-PCS | Mod: 26,,, | Performed by: STUDENT IN AN ORGANIZED HEALTH CARE EDUCATION/TRAINING PROGRAM

## 2023-10-27 PROCEDURE — 1159F PR MEDICATION LIST DOCUMENTED IN MEDICAL RECORD: ICD-10-PCS | Mod: CPTII,,, | Performed by: INTERNAL MEDICINE

## 2023-10-27 PROCEDURE — 99999 PR PBB SHADOW E&M-EST. PATIENT-LVL IV: CPT | Mod: PBBFAC,,, | Performed by: INTERNAL MEDICINE

## 2023-10-27 PROCEDURE — 99205 OFFICE O/P NEW HI 60 MIN: CPT | Mod: S$PBB,,, | Performed by: INTERNAL MEDICINE

## 2023-10-27 PROCEDURE — 71046 X-RAY EXAM CHEST 2 VIEWS: CPT | Mod: 26,,, | Performed by: STUDENT IN AN ORGANIZED HEALTH CARE EDUCATION/TRAINING PROGRAM

## 2023-10-27 PROCEDURE — 3008F BODY MASS INDEX DOCD: CPT | Mod: CPTII,,, | Performed by: INTERNAL MEDICINE

## 2023-10-27 PROCEDURE — 99205 PR OFFICE/OUTPT VISIT, NEW, LEVL V, 60-74 MIN: ICD-10-PCS | Mod: S$PBB,,, | Performed by: INTERNAL MEDICINE

## 2023-10-27 PROCEDURE — 3079F PR MOST RECENT DIASTOLIC BLOOD PRESSURE 80-89 MM HG: ICD-10-PCS | Mod: CPTII,,, | Performed by: INTERNAL MEDICINE

## 2023-10-27 PROCEDURE — 3074F PR MOST RECENT SYSTOLIC BLOOD PRESSURE < 130 MM HG: ICD-10-PCS | Mod: CPTII,,, | Performed by: INTERNAL MEDICINE

## 2023-10-27 PROCEDURE — 1160F RVW MEDS BY RX/DR IN RCRD: CPT | Mod: CPTII,,, | Performed by: INTERNAL MEDICINE

## 2023-10-27 PROCEDURE — 3079F DIAST BP 80-89 MM HG: CPT | Mod: CPTII,,, | Performed by: INTERNAL MEDICINE

## 2023-10-27 PROCEDURE — 71046 X-RAY EXAM CHEST 2 VIEWS: CPT | Mod: TC

## 2023-10-27 PROCEDURE — 3008F PR BODY MASS INDEX (BMI) DOCUMENTED: ICD-10-PCS | Mod: CPTII,,, | Performed by: INTERNAL MEDICINE

## 2023-10-27 PROCEDURE — 1160F PR REVIEW ALL MEDS BY PRESCRIBER/CLIN PHARMACIST DOCUMENTED: ICD-10-PCS | Mod: CPTII,,, | Performed by: INTERNAL MEDICINE

## 2023-10-27 RX ORDER — CLONAZEPAM 0.12 MG/1
0.12 TABLET, ORALLY DISINTEGRATING ORAL NIGHTLY
Qty: 90 TABLET | Refills: 0 | Status: SHIPPED | OUTPATIENT
Start: 2023-10-27 | End: 2024-01-25

## 2023-10-27 RX ORDER — TALC
6 POWDER (GRAM) TOPICAL NIGHTLY
Qty: 120 TABLET | Refills: 3 | Status: SHIPPED | OUTPATIENT
Start: 2023-10-27 | End: 2024-06-23

## 2023-10-27 NOTE — PATIENT INSTRUCTIONS
Safe sleeping environment -- All patients with RBD and their bed partners should be counseled on ways to alter the sleeping environment to prevent injury. For patients with mild symptoms, this may be all that is needed.  Safety for both patients and bed partners is the paramount concern. Firearms should not be accessible, and sharp or easily breakable items (such as lamps) should be removed from the immediate sleeping area. In the event of continued vigorous behaviors, sleeping alone is advised. Many patients resort to using padded bed rails or sleeping in a sleeping bag [79].  Other novel strategies are in development. Exiting the bed while acting out a dream is a high-risk behavior that may result in traumatic injury [83]. A bed alarm that delivers a customized calming message at the onset of dream enactment can prevent a patient from exiting the bed and avert sleep-related injury [84].
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
## 2023-10-27 NOTE — ASSESSMENT & PLAN NOTE
Discussed with patient the importance safety issues related to dream enactment  Firearms need to be taken out of the bedroom   Consider bed partner and alternative bedroom   We will institute melatonin and clonazepam.    Sleep diary   Environment that would provoke events including stress, sleep deprivation, change in medications, change in environment

## 2023-11-02 ENCOUNTER — HOSPITAL ENCOUNTER (OUTPATIENT)
Dept: SLEEP MEDICINE | Facility: HOSPITAL | Age: 28
Discharge: HOME | End: 2023-11-02
Attending: INTERNAL MEDICINE
Payer: MEDICAID

## 2023-11-02 DIAGNOSIS — G47.54 PARASOMNIA IN CONDITIONS CLASSIFIED ELSEWHERE: ICD-10-CM

## 2023-11-02 DIAGNOSIS — G47.00 INSOMNIA, UNSPECIFIED TYPE: ICD-10-CM

## 2023-11-02 DIAGNOSIS — G47.9 DISTURBANCE IN SLEEP BEHAVIOR: ICD-10-CM

## 2023-11-02 DIAGNOSIS — G47.33 OSA (OBSTRUCTIVE SLEEP APNEA): ICD-10-CM

## 2023-11-02 PROCEDURE — 95810 POLYSOM 6/> YRS 4/> PARAM: CPT

## 2023-11-02 NOTE — Clinical Note
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  PLMD  Recommendations:  1. Continue Klonopin + melatonin 2. Bedroom environment safety 3. Sleep hygiene

## 2023-11-05 PROCEDURE — 95810 PR POLYSOMNOGRAPHY, 4 OR MORE: ICD-10-PCS | Mod: 26,,, | Performed by: INTERNAL MEDICINE

## 2023-11-05 PROCEDURE — 95810 POLYSOM 6/> YRS 4/> PARAM: CPT | Mod: 26,,, | Performed by: INTERNAL MEDICINE
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
## 2023-11-27 RX ORDER — FLUVOXAMINE MALEATE 100 MG/1
TABLET, COATED ORAL
Qty: 30 TABLET | Refills: 0 | Status: SHIPPED | OUTPATIENT
Start: 2023-11-27 | End: 2023-12-01 | Stop reason: SDUPTHER

## 2023-11-30 ENCOUNTER — PATIENT MESSAGE (OUTPATIENT)
Dept: PSYCHIATRY | Facility: CLINIC | Age: 28
End: 2023-11-30
Payer: MEDICAID

## 2023-12-01 ENCOUNTER — OFFICE VISIT (OUTPATIENT)
Dept: PSYCHIATRY | Facility: CLINIC | Age: 28
End: 2023-12-01
Payer: MEDICAID

## 2023-12-01 DIAGNOSIS — G47.54 PARASOMNIA IN CONDITIONS CLASSIFIED ELSEWHERE: ICD-10-CM

## 2023-12-01 DIAGNOSIS — F41.1 GAD (GENERALIZED ANXIETY DISORDER): Primary | ICD-10-CM

## 2023-12-01 PROCEDURE — 99213 OFFICE O/P EST LOW 20 MIN: CPT | Mod: AF,HB,95, | Performed by: PSYCHIATRY & NEUROLOGY

## 2023-12-01 PROCEDURE — 99213 PR OFFICE/OUTPT VISIT, EST, LEVL III, 20-29 MIN: ICD-10-PCS | Mod: AF,HB,95, | Performed by: PSYCHIATRY & NEUROLOGY

## 2023-12-01 RX ORDER — FLUVOXAMINE MALEATE 100 MG/1
TABLET, COATED ORAL
Qty: 30 TABLET | Refills: 3 | Status: SHIPPED | OUTPATIENT
Start: 2023-12-01 | End: 2024-04-01 | Stop reason: SDUPTHER

## 2023-12-01 NOTE — PROGRESS NOTES
"Outpatient Psychiatry Follow-up Visit (MD/NP)    12/1/2023    Cony Lora, a 28 y.o. female, presenting for follow-up visit. Met with patient.    Reason for Encounter: Patient complains of anxiety.    Interval History: Patient seen & interviewed, last seen about three months ago. Feeling well.  at [employer] for past 3 weeks. Going well thus far.   Health is good. Had a sleep study. No serious problems identified. No new medications. No new illnesses. Adherence is better. No side effects.     Previous trials:    Sertraline (mood symptoms worsened despite it).   Duloxetine (worked for awhile then stopped)  Escitalopram.   Wellbutrin  Buspirone   Fluoxetine  Venlafaxine    Sleep:   Temazepam  Zolpidem  Trazodone      Background: Pt is a 22 y/o F who presents for establishment of care, referred by Dr. Kennedy, seen for psychotherapy by Javier Cardoso starting in July. Reports anxiety symptoms back to middle school, probably 7th grade, starting with social anxiety, worrying about others' judgement. Symptoms have gotten worse, more pervasive, & with concern for personal safety, meta-anxiety about anxiety, avoidance. Denies history of jovani trauma. Says that she "doesn't go anywhere anymore", dropped out of  due to anxiety. has lost or quit multiple jobs due to symptoms/ attendance. Understands nature of her fear is exaggerated. Has taken xanax (remote) from Dr. Laron Schreiber about 5 days/week. Was prescribed wellbutrin by Dr. Kennedy, but just managed two doses before self-discontinuing due to intolerable side effects (tingling).      Started therapy last year. Interrupted by insurance loss, then back. Thinks therapy helps. Xanax helps when she has a panic attack. Sometimes able to abort prior to attacks by deep breathing and self-talk. Depression since about age 16 after a bad relationship.     Past meds: buprop - side effects.   Sertraline - more depressed.     FamHx: mom, dad, sister - anxiety. "but I'm the " "worst".     Psych Hx: as above. Attempts at work have been unsuccessful due to "doubt myself", panic attacks, avoidance.     Social Hx: see below. Lives with boyfriend and his brother and sister-in-law.    Moved back from [place].   Cheated on BF in April. "trying to work it out".   Health is ok. No new health problems.   Started new therapy. To go back next week.   No new illnesses.   Relationship with most family is ok. Problems with dad ongoing.   Stayed with family for some time before reconciling with boyfriend.          Supportive relationship. No maltreatment. Good relationship with mom, 2 brothers, "so-so with sister", not much with dad (who has been quite critical; dad also doesn't approve of her bf who is black. Dad is also an alcoholic).     HPI: 21 y/o WF presented for initial evaluation, referred by Hilda Kennedy MD, in Internal Medicine, with chief complaint of chronic generalized anxiety, sleep deficits, frequent tearfulness, 40 lbs weight gain over approximately the past year. Reporting difficulty following through to complete tasks but considers it laziness & procrastination, not particularly focus difficulty. Endorsed difficulty with motivation with many tasks, as long as she can recall. Hated school both socially & academically. Pt described distinct family history of anxiety in both parents & sister. Pt stated she dropped out of the 11th grade secondary to anxiety & quickly obtained her GED. Reported stress in family from other's alcohol use problems & his negativity & intrusiveness when under the influence. Pt said she started to distance herself as a teen.  Reported also a lot of racial tension as patient has dated inter-racially & father angrily disapproved and been insulting. She has been in her current committed relationship the past 5 years, & her boyfriend is . Pt said she has been unable to maintain employment due to her anxiety, bailing out of jobs as a result of " insecurity on new jobs. She said she is supported by her mother & boyfriend. Semi-active [employer] but will watch services on TV rather than face people live in Confucianist services. Pt denied any si/hi, psychosis, cognitive deficit, rages, mood swings, or substance use problems. Identified therapeutic goals include reducing anxiety & improving coping skills for anxiety and interpersonal conflict.      Symptoms:   Mood: tearfulness and poor motivation  Anxiety: excessive anxiety/worry and social phobia, decreased sleep  Substance abuse: denied  Cognitive functioning: denied  Health behaviors: noncontributory     Psychiatric history: psychotropic management by PCP     Medical history: 40 pounds weight gain in the past year; currently moderately obese; kidney stones; denied any other physical/medical conditions     Family history of psychiatric illness: Mother, father, and sister all markedly anxious; father also with chronic alcohol use problems. Paternal grandfather also reported to be alcoholic.     Social history: Grew up in , the youngest of 4 siblings; has two brothers & a sister. Raised by both parents who remain together. Pt reported father's alcohol abuse led to many conflicts & tensions in the family home; pt feeling undermined, judged, not at all affirmed by him. She reported hating school, due in significant part to her anxiety. Reports chronic fear of judgment & inadequacy; impulse is to avoid social situations. Described father as having always been negative & judgmental as long as she can remember. Said father's mother was also the same way, very negative, prejudiced, & controlling. Pt very aware of racist/racially intollerant attitudes in her father & paternal grandparents. Reports she dropped out of school & promptly obtained her GED at age 15. Reported 2 committed relationships, the 1st one ending at age 17; said it was a bad relationship. Has been with current BF the past 5 years; describing him as  supportive & understanding  Reported both relationships have been interracial, evoking much conflict with her father & his side of the family. She has just avoided him as much as possible. Pt unemployed, feeling unable to tolerate the social aspect of work settings. Reports being financially supported by boyfriend & her mother. No children; never . No  experience.       Substance use: Alcohol: infrequent   Drugs: sporadic marijuana use from age 13 to current; last reported use August 17th.   Tobacco: former smoker age 13 to 18   Caffeine: none    Review Of Systems:     GENERAL:  No weight gain or loss  SKIN:  No rashes or lacerations  HEAD:  No headaches  CHEST:  No shortness of breath, hyperventilation or cough  CARDIOVASCULAR:  No tachycardia or chest pain  ABDOMEN:  No nausea, vomiting, pain, constipation or diarrhea  URINARY:  No frequency, dysuria or sexual dysfunction  ENDOCRINE:  No polydipsia, polyuria  MUSCULOSKELETAL:  No pain or stiffness of the joints  NEUROLOGIC:  No weakness, sensory changes, seizures, confusion, memory loss, tremor or other abnormal movements    Current Evaluation:     Nutritional Screening: Considering the patient's height and weight, medications, medical history and preferences, should a referral be made to the dietitian? no    Constitutional  Vitals:  Most recent vital signs, dated less than 90 days prior to this appointment, were not reviewed.       General:  unremarkable, age appropriate     Musculoskeletal  Muscle Strength/Tone:  no tremor, no tic   Gait & Station:  non-ataxic     Psychiatric  Appearance: casually dressed & groomed;   Behavior: calm,   Cooperation: cooperative with assessment  Speech: normal rate, volume, tone  Thought Process: linear, goal-directed  Thought Content: No suicidal or homicidal ideation; no delusions  Affect: anxious  Mood: anxious  Perceptions: No auditory or visual hallucinations  Level of Consciousness: alert throughout  interview  Insight: fair  Cognition: Oriented to person, place, time, & situation  Memory: no apparent deficits to general clinical interview; not formally assessed  Attention/Concentration: no apparent deficits to general clinical interview; not formally assessed  Fund of Knowledge: average by vocabulary/education    Laboratory Data  No visits with results within 1 Month(s) from this visit.   Latest known visit with results is:   Office Visit on 06/23/2021   Component Date Value Ref Range Status    Final Pathologic Diagnosis 06/23/2021    Final                    Value:Specimen Adequacy  Satisfactory for interpretation. Endocervical component is present.  [place] Category  Negative for intraepithelial lesion or malignancy.  Inflammation and bacteria present.      Disclaimer 06/23/2021    Final                    Value:The Pap smear is a screening test that aids in the detection of cervical  cancer and cancer precursors. Both false positive and false negative results  can occur. The test should be used at regular intervals, and positive results  should be confirmed before definitive therapy.  This liquid based specimen is processed using the  or  Thin PrepPAP  System. This specimen has been analyzed by the ThinPrep Imaging System  ([employer]), an automated imaging and review system which assists  the laboratory in evaluating cells on ThinPrep PAP tests. Following automated  imaging, selected fields from every slide are reviewed by a cytotechnologist  and/or pathologist.  Screening was performed at [facility], [address], [place] [address].      Trichomonas vaginalis 06/23/2021 Negative  Negative Final    Candida sp 06/23/2021 Negative  Negative Final    Kay glabrata DNA 06/23/2021 Negative  Negative Final    Kay krusei DNA 06/23/2021 Negative  Negative Final    Bacterial vaginosis DNA 06/23/2021 Positive (A)  Negative Final      Medications  Outpatient Encounter Medications as of 12/1/2023   Medication Sig Dispense Refill    clonazePAM (KLONOPIN) 0.125 MG disintegrating tablet Take 1 tablet (0.125 mg total) by mouth every evening. 90 tablet 0    fluvoxaMINE (LUVOX) 100 MG tablet Take 1/2 tablets at bedtime for 7 days then 1 tablet at bedtime thereafter. 30 tablet 0    melatonin (MELATIN) 3 mg tablet Take 2 tablets (6 mg total) by mouth nightly. 120 tablet 3     No facility-administered encounter medications on file as of 12/1/2023.     Assessment - Diagnosis - Goals:     Impression: 27 y/o F with hx of severe and pervasive anxiety, dysfunction in multiple life roles including occupational dysfunction. Has had limited previous treatment, modest benefits from xanax and psychotherapy. Some benefits from duloxetine, but ongoing symptoms, no better response at higher doses. Insufficient response to escitalopram. Stressors improved. Symptoms a more anxious, without benefit from most recent monoaminergic treatment changes.     Dx: MDD, Generalized anxiety disorder, insomnia    Treatment Goals:  Specify outcomes written in observable, behavioral terms: reduce symptoms by self-reports and scales. Improve functioning.     Treatment Plan/Recommendations:   Start fluvoxamine. Taper Venlafaxine, discontinue. Xanax prn.   Trazodone prn.  Discussed risks, benefits, and alternatives to treatment plan documented above with patient. I answered all patient questions related to this plan and patient expressed understanding and agreement.     Return to Clinic:     ELAINE Ga MD  Psychiatry  [facility]  [address] , [place] [address] [phone]

## 2024-03-28 ENCOUNTER — TELEPHONE (OUTPATIENT)
Dept: PSYCHIATRY | Facility: CLINIC | Age: 29
End: 2024-03-28
Payer: MEDICAID

## 2024-04-01 ENCOUNTER — OFFICE VISIT (OUTPATIENT)
Dept: PSYCHIATRY | Facility: CLINIC | Age: 29
End: 2024-04-01
Payer: MEDICAID

## 2024-04-01 DIAGNOSIS — G47.00 INSOMNIA, UNSPECIFIED TYPE: ICD-10-CM

## 2024-04-01 DIAGNOSIS — F41.1 GAD (GENERALIZED ANXIETY DISORDER): Primary | ICD-10-CM

## 2024-04-01 PROCEDURE — 99214 OFFICE O/P EST MOD 30 MIN: CPT | Mod: AF,HB,95, | Performed by: PSYCHIATRY & NEUROLOGY

## 2024-04-01 PROCEDURE — 90833 PSYTX W PT W E/M 30 MIN: CPT | Mod: AF,HB,95, | Performed by: PSYCHIATRY & NEUROLOGY

## 2024-04-01 RX ORDER — ALPRAZOLAM 0.5 MG/1
TABLET ORAL
Qty: 30 TABLET | Refills: 2 | Status: SHIPPED | OUTPATIENT
Start: 2024-04-01

## 2024-04-01 RX ORDER — FLUVOXAMINE MALEATE 100 MG/1
TABLET, COATED ORAL
Qty: 30 TABLET | Refills: 3 | Status: SHIPPED | OUTPATIENT
Start: 2024-04-01

## 2024-04-01 NOTE — PROGRESS NOTES
"Outpatient Psychiatry Follow-up Visit (MD/NP)    4/1/2024    Cony Lora, a 29 y.o. female, presenting for follow-up visit. Met with patient.    Reason for Encounter: Patient complains of anxiety.    Interval History: Patient seen & interviewed, last seen about three months ago. Reports that she continues to feel well. Continues to work as a  at [employer] for about five months now. Going well thus far. No new health problems since last visit.   Family and relationship are well. Stays home much of the time. Some questions of low interest vs. Anxiety. No new mental health problems. Adherence to medication is ok. No side effects.     Previous trials:    Sertraline (mood symptoms worsened despite it).   Duloxetine (worked for awhile then stopped)  Escitalopram.   Wellbutrin  Buspirone   Fluoxetine  Venlafaxine    Sleep:   Temazepam  Zolpidem  Trazodone      Background: Pt is a 24 y/o F who presents for establishment of care, referred by Dr. Kennedy, seen for psychotherapy by Javier Cardoso starting in July. Reports anxiety symptoms back to middle school, probably 7th grade, starting with social anxiety, worrying about others' judgement. Symptoms have gotten worse, more pervasive, & with concern for personal safety, meta-anxiety about anxiety, avoidance. Denies history of jovani trauma. Says that she "doesn't go anywhere anymore", dropped out of  due to anxiety. has lost or quit multiple jobs due to symptoms/ attendance. Understands nature of her fear is exaggerated. Has taken xanax (remote) from Dr. Laron Schreiber about 5 days/week. Was prescribed wellbutrin by Dr. Knenedy, but just managed two doses before self-discontinuing due to intolerable side effects (tingling).      Started therapy last year. Interrupted by insurance loss, then back. Thinks therapy helps. Xanax helps when she has a panic attack. Sometimes able to abort prior to attacks by deep breathing and self-talk. Depression since about age 16 " "after a bad relationship.     Past meds: buprop - side effects.   Sertraline - more depressed.     FamHx: mom, dad, sister - anxiety. "but I'm the worst".     Psych Hx: as above. Attempts at work have been unsuccessful due to "doubt myself", panic attacks, avoidance.     Social Hx: see below. Lives with boyfriend and his brother and sister-in-law.    Moved back from [place].   Cheated on BF in April. "trying to work it out".   Health is ok. No new health problems.   Started new therapy. To go back next week.   No new illnesses.   Relationship with most family is ok. Problems with dad ongoing.   Stayed with family for some time before reconciling with boyfriend.          Supportive relationship. No maltreatment. Good relationship with mom, 2 brothers, "so-so with sister", not much with dad (who has been quite critical; dad also doesn't approve of her bf who is black. Dad is also an alcoholic).     HPI: 21 y/o WF presented for initial evaluation, referred by Hilda Kennedy MD, in Internal Medicine, with chief complaint of chronic generalized anxiety, sleep deficits, frequent tearfulness, 40 lbs weight gain over approximately the past year. Reporting difficulty following through to complete tasks but considers it laziness & procrastination, not particularly focus difficulty. Endorsed difficulty with motivation with many tasks, as long as she can recall. Hated school both socially & academically. Pt described distinct family history of anxiety in both parents & sister. Pt stated she dropped out of the 11th grade secondary to anxiety & quickly obtained her GED. Reported stress in family from other's alcohol use problems & his negativity & intrusiveness when under the influence. Pt said she started to distance herself as a teen.  Reported also a lot of racial tension as patient has dated inter-racially & father angrily disapproved and been insulting. She has been in her current committed relationship the past 5 years, & " her boyfriend is . Pt said she has been unable to maintain employment due to her anxiety, bailing out of jobs as a result of insecurity on new jobs. She said she is supported by her mother & boyfriend. Semi-active Islam but will watch services on TV rather than face people live in [employer] services. Pt denied any si/hi, psychosis, cognitive deficit, rages, mood swings, or substance use problems. Identified therapeutic goals include reducing anxiety & improving coping skills for anxiety and interpersonal conflict.      Symptoms:   Mood: tearfulness and poor motivation  Anxiety: excessive anxiety/worry and social phobia, decreased sleep  Substance abuse: denied  Cognitive functioning: denied  Health behaviors: noncontributory     Psychiatric history: psychotropic management by PCP     Medical history: 40 pounds weight gain in the past year; currently moderately obese; kidney stones; denied any other physical/medical conditions     Family history of psychiatric illness: Mother, father, and sister all markedly anxious; father also with chronic alcohol use problems. Paternal grandfather also reported to be alcoholic.     Social history: Grew up in , the youngest of 4 siblings; has two brothers & a sister. Raised by both parents who remain together. Pt reported father's alcohol abuse led to many conflicts & tensions in the family home; pt feeling undermined, judged, not at all affirmed by him. She reported hating school, due in significant part to her anxiety. Reports chronic fear of judgment & inadequacy; impulse is to avoid social situations. Described father as having always been negative & judgmental as long as she can remember. Said father's mother was also the same way, very negative, prejudiced, & controlling. Pt very aware of racist/racially intollerant attitudes in her father & paternal grandparents. Reports she dropped out of school & promptly obtained her GED at age 15. Reported 2 committed  relationships, the 1st one ending at age 17; said it was a bad relationship. Has been with current BF the past 5 years; describing him as supportive & understanding  Reported both relationships have been interracial, evoking much conflict with her father & his side of the family. She has just avoided him as much as possible. Pt unemployed, feeling unable to tolerate the social aspect of work settings. Reports being financially supported by boyfriend & her mother. No children; never . No  experience.       Substance use: Alcohol: infrequent   Drugs: sporadic marijuana use from age 13 to current; last reported use August 17th.   Tobacco: former smoker age 13 to 18   Caffeine: none    Review Of Systems:     GENERAL:  No weight gain or loss  SKIN:  No rashes or lacerations  HEAD:  No headaches  CHEST:  No shortness of breath, hyperventilation or cough  CARDIOVASCULAR:  No tachycardia or chest pain  ABDOMEN:  No nausea, vomiting, pain, constipation or diarrhea  URINARY:  No frequency, dysuria or sexual dysfunction  ENDOCRINE:  No polydipsia, polyuria  MUSCULOSKELETAL:  No pain or stiffness of the joints  NEUROLOGIC:  No weakness, sensory changes, seizures, confusion, memory loss, tremor or other abnormal movements    Current Evaluation:     Nutritional Screening: Considering the patient's height and weight, medications, medical history and preferences, should a referral be made to the dietitian? no    Constitutional  Vitals:  Most recent vital signs, dated less than 90 days prior to this appointment, were not reviewed.       General:  unremarkable, age appropriate     Musculoskeletal  Muscle Strength/Tone:  no tremor, no tic   Gait & Station:  non-ataxic     Psychiatric  Appearance: casually dressed & groomed;   Behavior: calm,   Cooperation: cooperative with assessment  Speech: normal rate, volume, tone  Thought Process: linear, goal-directed  Thought Content: No suicidal or homicidal ideation; no  delusions  Affect: anxious  Mood: anxious  Perceptions: No auditory or visual hallucinations  Level of Consciousness: alert throughout interview  Insight: fair  Cognition: Oriented to person, place, time, & situation  Memory: no apparent deficits to general clinical interview; not formally assessed  Attention/Concentration: no apparent deficits to general clinical interview; not formally assessed  Fund of Knowledge: average by vocabulary/education    Laboratory Data  No visits with results within 1 Month(s) from this visit.   Latest known visit with results is:   Office Visit on 06/23/2021   Component Date Value Ref Range Status    Final Pathologic Diagnosis 06/23/2021    Final                    Value:Specimen Adequacy  Satisfactory for interpretation. Endocervical component is present.  [place] Category  Negative for intraepithelial lesion or malignancy.  Inflammation and bacteria present.      Disclaimer 06/23/2021    Final                    Value:The Pap smear is a screening test that aids in the detection of cervical  cancer and cancer precursors. Both false positive and false negative results  can occur. The test should be used at regular intervals, and positive results  should be confirmed before definitive therapy.  This liquid based specimen is processed using the  or [employer] Thin PrepPAP  System. This specimen has been analyzed by the ThinPrep Imaging System  ([employer]), an automated imaging and review system which assists  the laboratory in evaluating cells on ThinPrep PAP tests. Following automated  imaging, selected fields from every slide are reviewed by a cytotechnologist  and/or pathologist.  Screening was performed at [facility], [address] [place] [address].      Trichomonas vaginalis 06/23/2021 Negative  Negative Final    Candida sp 06/23/2021 Negative  Negative Final    Kay glabrata DNA 06/23/2021 Negative  Negative  Final    Kay krusei DNA 06/23/2021 Negative  Negative Final    Bacterial vaginosis DNA 06/23/2021 Positive (A)  Negative Final     Medications  Outpatient Encounter Medications as of 4/1/2024   Medication Sig Dispense Refill    clonazePAM (KLONOPIN) 0.125 MG disintegrating tablet Take 1 tablet (0.125 mg total) by mouth every evening. 90 tablet 0    fluvoxaMINE (LUVOX) 100 MG tablet Take 1 tablet at bedtime. 30 tablet 3    melatonin (MELATIN) 3 mg tablet Take 2 tablets (6 mg total) by mouth nightly. 120 tablet 3     No facility-administered encounter medications on file as of 4/1/2024.     Assessment - Diagnosis - Goals:     Impression: 27 y/o F with hx of severe and pervasive anxiety, dysfunction in multiple life roles including occupational dysfunction. Has had limited previous treatment, modest benefits from xanax and psychotherapy. Some benefits from duloxetine, but ongoing symptoms, no better response at higher doses. Insufficient response to escitalopram. Stressors improved. Symptoms a more anxious, without benefit from most recent monoaminergic treatment changes.     Dx: MDD, Generalized anxiety disorder, insomnia    Treatment Goals:  Specify outcomes written in observable, behavioral terms: reduce symptoms by self-reports and scales. Improve functioning.     Treatment Plan/Recommendations:   fluvoxamine. Xanax prn.   Trazodone prn.  Psychotherapy today - developing psychological mindedness, basic coping tools.   Discussed risks, benefits, and alternatives to treatment plan documented above with patient. I answered all patient questions related to this plan and patient expressed understanding and agreement.     Return to Clinic:     ELAINE Ga MD  Psychiatry  [facility]

## 2024-05-04 ENCOUNTER — PATIENT MESSAGE (OUTPATIENT)
Dept: PSYCHIATRY | Facility: CLINIC | Age: 29
End: 2024-05-04
Payer: MEDICAID

## 2024-07-31 ENCOUNTER — TELEPHONE (OUTPATIENT)
Dept: PSYCHIATRY | Facility: CLINIC | Age: 29
End: 2024-07-31
Payer: MEDICAID

## 2024-08-02 ENCOUNTER — OFFICE VISIT (OUTPATIENT)
Dept: PSYCHIATRY | Facility: CLINIC | Age: 29
End: 2024-08-02
Payer: MEDICAID

## 2024-08-02 VITALS
SYSTOLIC BLOOD PRESSURE: 108 MMHG | DIASTOLIC BLOOD PRESSURE: 74 MMHG | BODY MASS INDEX: 27.38 KG/M2 | HEART RATE: 76 BPM | WEIGHT: 174.81 LBS

## 2024-08-02 DIAGNOSIS — G47.00 INSOMNIA, UNSPECIFIED TYPE: ICD-10-CM

## 2024-08-02 DIAGNOSIS — G47.54 PARASOMNIA IN CONDITIONS CLASSIFIED ELSEWHERE: ICD-10-CM

## 2024-08-02 DIAGNOSIS — F41.1 GAD (GENERALIZED ANXIETY DISORDER): Primary | ICD-10-CM

## 2024-08-02 PROCEDURE — 99212 OFFICE O/P EST SF 10 MIN: CPT | Mod: PBBFAC | Performed by: PSYCHIATRY & NEUROLOGY

## 2024-08-02 RX ORDER — ALPRAZOLAM 0.5 MG/1
TABLET ORAL
Qty: 30 TABLET | Refills: 2 | Status: SHIPPED | OUTPATIENT
Start: 2024-08-02

## 2024-08-02 RX ORDER — FLUVOXAMINE MALEATE 100 MG/1
TABLET, COATED ORAL
Qty: 30 TABLET | Refills: 3 | Status: SHIPPED | OUTPATIENT
Start: 2024-08-02

## 2024-08-02 NOTE — PROGRESS NOTES
"Outpatient Psychiatry Follow-up Visit (MD/NP)    8/2/2024    Cony Lora, a 29 y.o. female, presenting for follow-up visit. Met with patient.    Reason for Encounter: Patient complains of anxiety.    Interval History: Patient seen & interviewed, last seen about three months ago. Reports that she continues to feel well. Cycles of adherence with medication. Messes with sleep when misses doses (parasomnia).   Moods not worse than at last visit.   Some irritability. No new stressors.   Continues to work as a  at [employer] for about five months now.   Considering a switch due to inconsistent hours. May seek a second job. No new health problems. Family and relationship are going well. Planning [place] trip to visit brother for b-day.       Previous trials:    Sertraline (mood symptoms worsened despite it).   Duloxetine (worked for awhile then stopped)  Escitalopram.   Wellbutrin  Buspirone   Fluoxetine  Venlafaxine    Sleep:   Temazepam  Zolpidem  Trazodone    Background: Pt is a 22 y/o F who presents for establishment of care, referred by Dr. Kennedy, seen for psychotherapy by Javier Cardoso starting in July. Reports anxiety symptoms back to middle school, probably 7th grade, starting with social anxiety, worrying about others' judgement. Symptoms have gotten worse, more pervasive, & with concern for personal safety, meta-anxiety about anxiety, avoidance. Denies history of jovani trauma. Says that she "doesn't go anywhere anymore", dropped out of  due to anxiety. has lost or quit multiple jobs due to symptoms/ attendance. Understands nature of her fear is exaggerated. Has taken xanax (remote) from Dr. Laron Schreiber about 5 days/week. Was prescribed wellbutrin by Dr. Kennedy, but just managed two doses before self-discontinuing due to intolerable side effects (tingling).      Started therapy last year. Interrupted by insurance loss, then back. Thinks therapy helps. Xanax helps when she has a panic attack. Sometimes " "able to abort prior to attacks by deep breathing and self-talk. Depression since about age 16 after a bad relationship.     Past meds: buprop - side effects.   Sertraline - more depressed.     FamHx: mom, dad, sister - anxiety. "but I'm the worst".     Psych Hx: as above. Attempts at work have been unsuccessful due to "doubt myself", panic attacks, avoidance.     Social Hx: see below. Lives with boyfriend and his brother and sister-in-law.Moved back from [place].   Cheated on BF in April. "trying to work it out". Health is ok. No new health problems. Started new therapy. To go back next week. No new illnesses.   Relationship with most family is ok. Problems with dad ongoing. Stayed with family for some time before reconciling with boyfriend. Supportive relationship. No maltreatment. Good relationship with mom, 2 brothers, "so-so with sister", not much with dad (who has been quite critical; dad also doesn't approve of her bf who is black. Dad is also an alcoholic).     HPI: 23 y/o WF presented for initial evaluation, referred by Hilda Kennedy MD, in Internal Medicine, with chief complaint of chronic generalized anxiety, sleep deficits, frequent tearfulness, 40 lbs weight gain over approximately the past year. Reporting difficulty following through to complete tasks but considers it laziness & procrastination, not particularly focus difficulty. Endorsed difficulty with motivation with many tasks, as long as she can recall. Hated school both socially & academically. Pt described distinct family history of anxiety in both parents & sister. Pt stated she dropped out of the 11th grade secondary to anxiety & quickly obtained her GED. Reported stress in family from other's alcohol use problems & his negativity & intrusiveness when under the influence. Pt said she started to distance herself as a teen. Reported also a lot of racial tension as pt has dated inter-racially & father angrily disapproved and been insulting. She " has been in her current committed relationship the past 5 years, & her boyfriend is Af. American. Pt said she has been unable to maintain employment due to her anxiety, bailing out of jobs as a result of insecurity on new jobs. She said she is supported by her mother & boyfriend. Semi-active [employer] but will watch services on TV rather than face people live in [employer] services. Pt denied any si/hi, psychosis, cognitive deficit, rages, mood swings, or substance use problems. Identified therapeutic goals include reducing anxiety & improving coping skills for anxiety & interpersonal conflict.      Symptoms:   Mood: tearfulness and poor motivation  Anxiety: excessive anxiety/worry and social phobia, decreased sleep  Substance abuse: denied  Cognitive functioning: denied  Health behaviors: noncontributory     Psychiatric history: psychotropic management by PCP     Medical history: 40 pounds weight gain in the past year; currently moderately obese; kidney stones; denied any other physical/medical conditions     Family history of psychiatric illness: Mother, father, and sister all markedly anxious; father also with chronic alcohol use problems. Paternal grandfather also reported to be alcoholic.     Social history: Grew up in , the youngest of 4 siblings; has two brothers & a sister. Raised by both parents who remain together. Pt reported father's alcohol abuse led to many conflicts & tensions in the family home; pt feeling undermined, judged, not at all affirmed by him. She reported hating school, due in significant part to her anxiety. Reports chronic fear of judgment & inadequacy; impulse is to avoid social situations. Described father as having always been negative & judgmental as long as she can remember. Said father's mother was also the same way, very negative, prejudiced, & controlling. Pt very aware of racist/racially intollerant attitudes in her father & paternal grandparents. Reports she dropped out of school  & promptly obtained her GED at age 15. Reported 2 committed relationships, the 1st one ending at age 17; said it was a bad relationship. Has been with current BF the past 5 years; describing him as supportive & understanding  Reported both relationships have been interracial, evoking much conflict with her father & his side of the family. She has just avoided him as much as possible. Pt unemployed, feeling unable to tolerate the social aspect of work settings. Reports being financially supported by boyfriend & her mother. No children; never . No  experience.       Substance use: Alcohol: infrequent   Drugs: sporadic marijuana use from age 13 to current; last reported use August 17th.   Tobacco: former smoker age 13 to 18   Caffeine: none    Review Of Systems:     GENERAL:  No weight gain or loss  SKIN:  No rashes or lacerations  HEAD:  No headaches  CHEST:  No shortness of breath, hyperventilation or cough  CARDIOVASCULAR:  No tachycardia or chest pain  ABDOMEN:  No nausea, vomiting, pain, constipation or diarrhea  URINARY:  No frequency, dysuria or sexual dysfunction  ENDOCRINE:  No polydipsia, polyuria  MUSCULOSKELETAL:  No pain or stiffness of the joints  NEUROLOGIC:  No weakness, sensory changes, seizures, confusion, memory loss, tremor or other abnormal movements    Current Evaluation:     Nutritional Screening: Considering the patient's height and weight, medications, medical history and preferences, should a referral be made to the dietitian? no    Constitutional  Vitals:  Most recent vital signs, dated less than 90 days prior to this appointment, were not reviewed.       General:  unremarkable, age appropriate     Musculoskeletal  Muscle Strength/Tone:  no tremor, no tic   Gait & Station:  non-ataxic     Psychiatric  Appearance: casually dressed & groomed;   Behavior: calm,   Cooperation: cooperative with assessment  Speech: normal rate, volume, tone  Thought Process: linear,  goal-directed  Thought Content: No suicidal or homicidal ideation; no delusions  Affect: anxious  Mood: anxious  Perceptions: No auditory or visual hallucinations  Level of Consciousness: alert throughout interview  Insight: fair  Cognition: Oriented to person, place, time, & situation  Memory: no apparent deficits to general clinical interview; not formally assessed  Attention/Concentration: no apparent deficits to general clinical interview; not formally assessed  Fund of Knowledge: average by vocabulary/education    Laboratory Data  No visits with results within 1 Month(s) from this visit.   Latest known visit with results is:   Office Visit on 06/23/2021   Component Date Value Ref Range Status    Final Pathologic Diagnosis 06/23/2021    Final                    Value:Specimen Adequacy  Satisfactory for interpretation. Endocervical component is present.  [place] Category  Negative for intraepithelial lesion or malignancy.  Inflammation and bacteria present.      Disclaimer 06/23/2021    Final                    Value:The Pap smear is a screening test that aids in the detection of cervical  cancer and cancer precursors. Both false positive and false negative results  can occur. The test should be used at regular intervals, and positive results  should be confirmed before definitive therapy.  This liquid based specimen is processed using the  or  Thin PrepPAP  System. This specimen has been analyzed by the ThinPrep Imaging System  ([employer]), an automated imaging and review system which assists  the laboratory in evaluating cells on ThinPrep PAP tests. Following automated  imaging, selected fields from every slide are reviewed by a cytotechnologist  and/or pathologist.  Screening was performed at [facility], [address] [place] [address].      Trichomonas vaginalis 06/23/2021 Negative  Negative Final    Candida sp 06/23/2021 Negative   Negative Final    Kay glabrata DNA 06/23/2021 Negative  Negative Final    Kay krusei DNA 06/23/2021 Negative  Negative Final    Bacterial vaginosis DNA 06/23/2021 Positive (A)  Negative Final     Medications  Outpatient Encounter Medications as of 8/2/2024   Medication Sig Dispense Refill    ALPRAZolam (XANAX) 0.5 MG tablet Take 1 tablet daily as needed for anxiety. 30 tablet 2    clonazePAM (KLONOPIN) 0.125 MG disintegrating tablet Take 1 tablet (0.125 mg total) by mouth every evening. 90 tablet 0    fluvoxaMINE (LUVOX) 100 MG tablet Take 1 tablet at bedtime. 30 tablet 3     No facility-administered encounter medications on file as of 8/2/2024.     Assessment - Diagnosis - Goals:     Impression: 27 y/o F with hx of severe and pervasive anxiety, dysfunction in multiple life roles including occupational dysfunction. Has had limited previous treatment, modest benefits from xanax and psychotherapy. Some benefits from duloxetine, but ongoing symptoms, no better response at higher doses. Insufficient response to escitalopram. Stressors improved. Symptoms a more anxious, without benefit from most recent monoaminergic treatment changes.     Dx: MDD, Generalized anxiety disorder, insomnia    Treatment Goals:  Specify outcomes written in observable, behavioral terms: reduce symptoms by self-reports and scales. Improve functioning.     Treatment Plan/Recommendations:   fluvoxamine. Xanax prn.   Trazodone prn.  Psychotherapy today - making peace with chronic mental health condition for purposes of long-term self-care.   Discussed risks, benefits, and alternatives to treatment plan documented above with patient. I answered all patient questions related to this plan and patient expressed understanding and agreement.     Return to Clinic:     ELAINE Ga MD  Psychiatry  [facility]

## 2024-09-25 NOTE — PROGRESS NOTES
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 unremarkable, age appropriate  Behavior: cooperative, tearful, restless and fidgety  Speech: normal tone, normal rate, normal pitch, normal volume  Mood: anxious, sad  Affect: congruent and appropriate  Thought Process: normal and logical  Thought Content: normal, no suicidality, no homicidality, delusions, or paranoia  Sensorium: grossly intact  Cognition: grossly intact  Insight: intact  Judgment: adequate to circumstances    Strengths and liabilities: Strength: Patient accepts guidance/feedback, Strength: Patient is expressive/articulate, Strength: Patient is intelligent, Strength: Patient is motivated for change, Strength: Patient is physically healthy, Strength: Patient has positive support network, and Liability: Patient lacks coping skills      Diagnostic Impression - Plan:     1. ARMANDO (generalized anxiety disorder)    2. Mild episode of recurrent major depressive disorder    3. Insomnia, unspecified type         Plan:individual psychotherapy      Return to Clinic: 1 week, 2 weeks  Pt Reported to Schedule Self via Epic EMR MyChart Application and/or Department Support Staff         Anjali Hernandez LCSW  10/1/2024  9:00 AM

## 2024-09-27 ENCOUNTER — TELEPHONE (OUTPATIENT)
Dept: PSYCHIATRY | Facility: CLINIC | Age: 29
End: 2024-09-27
Payer: MEDICAID

## 2024-10-01 ENCOUNTER — OFFICE VISIT (OUTPATIENT)
Dept: PSYCHIATRY | Facility: CLINIC | Age: 29
End: 2024-10-01
Payer: MEDICAID

## 2024-10-01 DIAGNOSIS — F33.0 MILD EPISODE OF RECURRENT MAJOR DEPRESSIVE DISORDER: ICD-10-CM

## 2024-10-01 DIAGNOSIS — F41.1 GAD (GENERALIZED ANXIETY DISORDER): Primary | ICD-10-CM

## 2024-10-01 DIAGNOSIS — G47.00 INSOMNIA, UNSPECIFIED TYPE: ICD-10-CM

## 2024-10-01 PROCEDURE — 90791 PSYCH DIAGNOSTIC EVALUATION: CPT | Mod: AJ,HB,95, | Performed by: SOCIAL WORKER

## 2024-10-11 ENCOUNTER — TELEPHONE (OUTPATIENT)
Dept: PSYCHIATRY | Facility: CLINIC | Age: 29
End: 2024-10-11
Payer: MEDICAID

## 2024-10-25 ENCOUNTER — TELEPHONE (OUTPATIENT)
Dept: PSYCHIATRY | Facility: CLINIC | Age: 29
End: 2024-10-25
Payer: MEDICAID

## 2024-10-29 ENCOUNTER — OFFICE VISIT (OUTPATIENT)
Dept: PSYCHIATRY | Facility: CLINIC | Age: 29
End: 2024-10-29
Payer: MEDICAID

## 2024-10-29 DIAGNOSIS — F41.1 GAD (GENERALIZED ANXIETY DISORDER): Primary | ICD-10-CM

## 2024-10-29 DIAGNOSIS — F33.0 MILD EPISODE OF RECURRENT MAJOR DEPRESSIVE DISORDER: ICD-10-CM

## 2024-10-29 PROCEDURE — 90834 PSYTX W PT 45 MINUTES: CPT | Mod: AJ,HB,95, | Performed by: SOCIAL WORKER

## 2024-11-06 ENCOUNTER — OFFICE VISIT (OUTPATIENT)
Dept: PSYCHIATRY | Facility: CLINIC | Age: 29
End: 2024-11-06
Payer: MEDICAID

## 2024-11-06 VITALS
HEART RATE: 60 BPM | BODY MASS INDEX: 27.62 KG/M2 | WEIGHT: 176.38 LBS | SYSTOLIC BLOOD PRESSURE: 105 MMHG | DIASTOLIC BLOOD PRESSURE: 74 MMHG

## 2024-11-06 DIAGNOSIS — G47.54 PARASOMNIA IN CONDITIONS CLASSIFIED ELSEWHERE: ICD-10-CM

## 2024-11-06 DIAGNOSIS — F41.1 GAD (GENERALIZED ANXIETY DISORDER): Primary | ICD-10-CM

## 2024-11-06 DIAGNOSIS — G47.00 INSOMNIA, UNSPECIFIED TYPE: ICD-10-CM

## 2024-11-06 PROCEDURE — 3074F SYST BP LT 130 MM HG: CPT | Mod: CPTII,,, | Performed by: PSYCHIATRY & NEUROLOGY

## 2024-11-06 PROCEDURE — 3078F DIAST BP <80 MM HG: CPT | Mod: CPTII,,, | Performed by: PSYCHIATRY & NEUROLOGY

## 2024-11-06 PROCEDURE — 99214 OFFICE O/P EST MOD 30 MIN: CPT | Mod: S$PBB,AF,HB, | Performed by: PSYCHIATRY & NEUROLOGY

## 2024-11-06 PROCEDURE — 99999 PR PBB SHADOW E&M-EST. PATIENT-LVL II: CPT | Mod: PBBFAC,AF,HB, | Performed by: PSYCHIATRY & NEUROLOGY

## 2024-11-06 PROCEDURE — 3008F BODY MASS INDEX DOCD: CPT | Mod: CPTII,,, | Performed by: PSYCHIATRY & NEUROLOGY

## 2024-11-06 PROCEDURE — 99212 OFFICE O/P EST SF 10 MIN: CPT | Mod: PBBFAC | Performed by: PSYCHIATRY & NEUROLOGY

## 2024-11-06 RX ORDER — FLUVOXAMINE MALEATE 100 MG/1
TABLET, COATED ORAL
Qty: 90 TABLET | Refills: 1 | Status: SHIPPED | OUTPATIENT
Start: 2024-11-06

## 2024-11-06 NOTE — PROGRESS NOTES
"Outpatient Psychiatry Follow-up Visit (MD/NP)    11/6/2024    Cony Lora, a 29 y.o. female, presenting for follow-up visit. Met with patient.    Reason for Encounter: Patient complains of anxiety.    Interval History: Patient seen & interviewed, last seen about three months ago. Reports that she continues to feel well. BPPV treated. Improved. No new general health complaints or conditions. No new medications since last time. Mental health mostly ok. No new mental health problems. Sleep is somewhat improved, but more problematic. Some increased sleep. Once instance of parasomnic behavior (punched headboard). Clinches teeth during sleep. Continues to work at her job, goes well. No longer looking for more work with her current skills. Taking book-keeping course, researching other ideas like . Family and relationship are going well. Will travel to [place] to visit brother for patient's b-day.   Seeing Anjali Hernandez for therapy.     Previous trials:    Sertraline (mood symptoms worsened despite it).   Duloxetine (worked for awhile then stopped)  Escitalopram.   Wellbutrin  Buspirone   Fluoxetine  Venlafaxine    Sleep:   Temazepam  Zolpidem  Trazodone    Background: Pt is a 24 y/o F who presents for establishment of care, referred by Dr. Kennedy, seen for psychotherapy by Javier Cardoso starting in July. Reports anxiety symptoms back to middle school, probably 7th grade, starting with social anxiety, worrying about others' judgement. Symptoms have gotten worse, more pervasive, & with concern for personal safety, meta-anxiety about anxiety, avoidance. Denies history of jovani trauma. Says that she "doesn't go anywhere anymore", dropped out of  due to anxiety. has lost or quit multiple jobs due to symptoms/ attendance. Understands nature of her fear is exaggerated. Has taken xanax (remote) from Dr. Laron Schreiber about 5 days/week. Was prescribed wellbutrin by Dr. Kennedy, but just managed two doses before " "self-discontinuing due to intolerable side effects (tingling).      Started therapy last year. Interrupted by insurance loss, then back. Thinks therapy helps. Xanax helps when she has a panic attack. Sometimes able to abort prior to attacks by deep breathing and self-talk. Depression since about age 16 after a bad relationship.     Past meds: buprop - side effects.   Sertraline - more depressed.     FamHx: mom, dad, sister - anxiety. "but I'm the worst".     Psych Hx: as above. Attempts at work have been unsuccessful due to "doubt myself", panic attacks, avoidance.     Social Hx: see below. Lives with boyfriend and his brother and sister-in-law.Moved back from [place].   Cheated on BF in April. "trying to work it out". Health is ok. No new health problems. Started new therapy. To go back next week. No new illnesses.   Relationship with most family is ok. Problems with dad ongoing. Stayed with family for some time before reconciling with boyfriend. Supportive relationship. No maltreatment. Good relationship with mom, 2 brothers, "so-so with sister", not much with dad (who has been quite critical; dad also doesn't approve of her bf who is black. Dad is also an alcoholic).     HPI: 23 y/o WF presented for initial evaluation, referred by Hilda Kennedy MD, in Internal Medicine, with chief complaint of chronic generalized anxiety, sleep deficits, frequent tearfulness, 40 lbs weight gain over approximately the past year. Reporting difficulty following through to complete tasks but considers it laziness & procrastination, not particularly focus difficulty. Endorsed difficulty with motivation with many tasks, as long as she can recall. Hated school both socially & academically. Pt described distinct family history of anxiety in both parents & sister. Pt stated she dropped out of the 11th grade secondary to anxiety & quickly obtained her GED. Reported stress in family from other's alcohol use problems & his negativity & " intrusiveness when under the influence. Pt said she started to distance herself as a teen. Reported also a lot of racial tension as pt has dated inter-racially & father angrily disapproved and been insulting. She has been in her current committed relationship the past 5 years, & her boyfriend is Af. American. Pt said she has been unable to maintain employment due to her anxiety, bailing out of jobs as a result of insecurity on new jobs. She said she is supported by her mother & boyfriend. Semi-active [employer] but will watch services on TV rather than face people live in [place] services. Pt denied any si/hi, psychosis, cognitive deficit, rages, mood swings, or substance use problems. Identified therapeutic goals include reducing anxiety & improving coping skills for anxiety & interpersonal conflict.      Symptoms:   Mood: tearfulness and poor motivation  Anxiety: excessive anxiety/worry and social phobia, decreased sleep  Substance abuse: denied  Cognitive functioning: denied  Health behaviors: noncontributory     Psychiatric history: psychotropic management by PCP     Medical history: 40 pounds weight gain in the past year; currently moderately obese; kidney stones; denied any other physical/medical conditions     Family history of psychiatric illness: Mother, father, and sister all markedly anxious; father also with chronic alcohol use problems. Paternal grandfather also reported to be alcoholic.     Social history: Grew up in , the youngest of 4 siblings; has two brothers & a sister. Raised by both parents who remain together. Pt reported father's alcohol abuse led to many conflicts & tensions in the family home; pt feeling undermined, judged, not at all affirmed by him. She reported hating school, due in significant part to her anxiety. Reports chronic fear of judgment & inadequacy; impulse is to avoid social situations. Described father as having always been negative & judgmental as long as she can remember.  Said father's mother was also the same way, very negative, prejudiced, & controlling. Pt very aware of racist/racially intollerant attitudes in her father & paternal grandparents. Reports she dropped out of school & promptly obtained her GED at age 15. Reported 2 committed relationships, the 1st one ending at age 17; said it was a bad relationship. Has been with current BF the past 5 years; describing him as supportive & understanding  Reported both relationships have been interracial, evoking much conflict with her father & his side of the family. She has just avoided him as much as possible. Pt unemployed, feeling unable to tolerate the social aspect of work settings. Reports being financially supported by boyfriend & her mother. No children; never . No  experience.       Substance use: Alcohol: infrequent   Drugs: sporadic marijuana use from age 13 to current; last reported use August 17th.   Tobacco: former smoker age 13 to 18   Caffeine: none    Review Of Systems:     GENERAL:  No weight gain or loss  SKIN:  No rashes or lacerations  HEAD:  No headaches  CHEST:  No shortness of breath, hyperventilation or cough  CARDIOVASCULAR:  No tachycardia or chest pain  ABDOMEN:  No nausea, vomiting, pain, constipation or diarrhea  URINARY:  No frequency, dysuria or sexual dysfunction  ENDOCRINE:  No polydipsia, polyuria  MUSCULOSKELETAL:  No pain or stiffness of the joints  NEUROLOGIC:  No weakness, sensory changes, seizures, confusion, memory loss, tremor or other abnormal movements    Current Evaluation:     Nutritional Screening: Considering the patient's height and weight, medications, medical history and preferences, should a referral be made to the dietitian? no    Constitutional  Vitals:  Most recent vital signs, dated less than 90 days prior to this appointment, were not reviewed.       General:  unremarkable, age appropriate     Musculoskeletal  Muscle Strength/Tone:  no tremor, no tic   Gait &  Station:  non-ataxic     Psychiatric  Appearance: casually dressed & groomed;   Behavior: calm,   Cooperation: cooperative with assessment  Speech: normal rate, volume, tone  Thought Process: linear, goal-directed  Thought Content: No suicidal or homicidal ideation; no delusions  Affect: anxious  Mood: anxious  Perceptions: No auditory or visual hallucinations  Level of Consciousness: alert throughout interview  Insight: fair  Cognition: Oriented to person, place, time, & situation  Memory: no apparent deficits to general clinical interview; not formally assessed  Attention/Concentration: no apparent deficits to general clinical interview; not formally assessed  Fund of Knowledge: average by vocabulary/education    Laboratory Data  No visits with results within 1 Month(s) from this visit.   Latest known visit with results is:   Office Visit on 06/23/2021   Component Date Value Ref Range Status    Final Pathologic Diagnosis 06/23/2021    Final                    Value:Specimen Adequacy  Satisfactory for interpretation. Endocervical component is present.  [place] Category  Negative for intraepithelial lesion or malignancy.  Inflammation and bacteria present.      Disclaimer 06/23/2021    Final                    Value:The Pap smear is a screening test that aids in the detection of cervical  cancer and cancer precursors. Both false positive and false negative results  can occur. The test should be used at regular intervals, and positive results  should be confirmed before definitive therapy.  This liquid based specimen is processed using the  or  Thin PrepPAP  System. This specimen has been analyzed by the ThinPrep Imaging System  ([employer]), an automated imaging and review system which assists  the laboratory in evaluating cells on ThinPrep PAP tests. Following automated  imaging, selected fields from every slide are reviewed by a cytotechnologist  and/or pathologist.  Screening was performed at  [facility], [address] [place] [address].      Trichomonas vaginalis 06/23/2021 Negative  Negative Final    Candida sp 06/23/2021 Negative  Negative Final    Kay glabrata DNA 06/23/2021 Negative  Negative Final    Kay krusei DNA 06/23/2021 Negative  Negative Final    Bacterial vaginosis DNA 06/23/2021 Positive (A)  Negative Final     Medications  Outpatient Encounter Medications as of 11/6/2024   Medication Sig Dispense Refill    ALPRAZolam (XANAX) 0.5 MG tablet Take 1 tablet daily as needed for anxiety. 30 tablet 2    clonazePAM (KLONOPIN) 0.125 MG disintegrating tablet Take 1 tablet (0.125 mg total) by mouth every evening. 90 tablet 0    fluvoxaMINE (LUVOX) 100 MG tablet Take 1 tablet at bedtime. 30 tablet 3     No facility-administered encounter medications on file as of 11/6/2024.     Assessment - Diagnosis - Goals:     Impression: 30 y/o F with hx of severe and pervasive anxiety, dysfunction in multiple life roles including occupational dysfunction. Has had limited previous treatment, modest benefits from xanax and psychotherapy. Some benefits from duloxetine, but ongoing symptoms, no better response at higher doses. Insufficient response to escitalopram. Fluvoxamine has been more helpful.     Dx: MDD, Generalized anxiety disorder, insomnia    Treatment Goals:  Specify outcomes written in observable, behavioral terms: reduce symptoms by self-reports and scales. Improve functioning.     Treatment Plan/Recommendations:   fluvoxamine. Xanax prn.   Trazodone prn.  Psychotherapy today - making peace with chronic mental health condition for purposes of long-term self-care.   Discussed risks, benefits, and alternatives to treatment plan documented above with patient. I answered all patient questions related to this plan and patient expressed understanding and agreement.     Return to Clinic:     ELAINE Ga MD  Psychiatry  [facility]  [place]

## 2025-04-07 ENCOUNTER — OFFICE VISIT (OUTPATIENT)
Dept: PSYCHIATRY | Facility: CLINIC | Age: 30
End: 2025-04-07
Payer: MEDICAID

## 2025-04-07 VITALS
HEART RATE: 63 BPM | WEIGHT: 176.13 LBS | BODY MASS INDEX: 27.59 KG/M2 | DIASTOLIC BLOOD PRESSURE: 77 MMHG | SYSTOLIC BLOOD PRESSURE: 113 MMHG

## 2025-04-07 DIAGNOSIS — F41.1 GAD (GENERALIZED ANXIETY DISORDER): Primary | ICD-10-CM

## 2025-04-07 DIAGNOSIS — F33.0 MILD EPISODE OF RECURRENT MAJOR DEPRESSIVE DISORDER: ICD-10-CM

## 2025-04-07 PROCEDURE — 99212 OFFICE O/P EST SF 10 MIN: CPT | Mod: PBBFAC | Performed by: PSYCHIATRY & NEUROLOGY

## 2025-04-07 PROCEDURE — 3008F BODY MASS INDEX DOCD: CPT | Mod: AF,HB,CPTII, | Performed by: PSYCHIATRY & NEUROLOGY

## 2025-04-07 PROCEDURE — 99999 PR PBB SHADOW E&M-EST. PATIENT-LVL II: CPT | Mod: PBBFAC,AF,HB, | Performed by: PSYCHIATRY & NEUROLOGY

## 2025-04-07 PROCEDURE — 3078F DIAST BP <80 MM HG: CPT | Mod: AF,HB,CPTII, | Performed by: PSYCHIATRY & NEUROLOGY

## 2025-04-07 PROCEDURE — 99214 OFFICE O/P EST MOD 30 MIN: CPT | Mod: S$PBB,AF,HB, | Performed by: PSYCHIATRY & NEUROLOGY

## 2025-04-07 PROCEDURE — 3074F SYST BP LT 130 MM HG: CPT | Mod: AF,HB,CPTII, | Performed by: PSYCHIATRY & NEUROLOGY

## 2025-04-07 RX ORDER — ALPRAZOLAM 0.5 MG/1
TABLET ORAL
Qty: 30 TABLET | Refills: 2 | Status: SHIPPED | OUTPATIENT
Start: 2025-04-07

## 2025-04-07 RX ORDER — SERTRALINE HYDROCHLORIDE 100 MG/1
TABLET, FILM COATED ORAL
Qty: 30 TABLET | Refills: 3 | Status: SHIPPED | OUTPATIENT
Start: 2025-04-07

## 2025-04-07 NOTE — PROGRESS NOTES
"Outpatient Psychiatry Follow-up Visit (MD/NP)    4/7/2025    Cony Lora, a 30 y.o. female, presenting for follow-up visit. Met with patient.    Reason for Encounter: Patient complains of anxiety.    Interval History: Patient seen & interviewed, last seen about three months ago. Reports that she continues to feel well. Moods more anxious dysphoric. Coping in spite of it.  Family and relationship are going ok. Will travel to [place] to visit brother for patient's b-day. No new illnesses. No new medications. Has been adherent with prescribed medications.     Previous trials:    Sertraline (mood symptoms worsened despite it).   Duloxetine (worked for awhile then stopped)  Escitalopram.   Wellbutrin  Buspirone   Fluoxetine  Venlafaxine    Sleep:   Temazepam  Zolpidem  Trazodone    Background: Pt is a 24 y/o F who presents for establishment of care, referred by Dr. Kennedy, seen for psychotherapy by Javier Cardoso starting in July. Reports anxiety symptoms back to middle school, probably 7th grade, starting with social anxiety, worrying about others' judgement. Symptoms have gotten worse, more pervasive, & with concern for personal safety, meta-anxiety about anxiety, avoidance. Denies history of jovani trauma. Says that she "doesn't go anywhere anymore", dropped out of  due to anxiety. has lost or quit multiple jobs due to symptoms/ attendance. Understands nature of her fear is exaggerated. Has taken xanax (remote) from Dr. Laron Schreiber about 5 days/week. Was prescribed wellbutrin by Dr. Kennedy, but just managed two doses before self-discontinuing due to intolerable side effects (tingling).      Started therapy last year. Interrupted by insurance loss, then back. Thinks therapy helps. Xanax helps when she has a panic attack. Sometimes able to abort prior to attacks by deep breathing and self-talk. Depression since about age 16 after a bad relationship.     Past meds: buprop - side effects.   Sertraline - more depressed. " "    FamHx: mom, dad, sister - anxiety. "but I'm the worst".     Psych Hx: as above. Attempts at work have been unsuccessful due to "doubt myself", panic attacks, avoidance.     Social Hx: see below. Lives with boyfriend and his brother and sister-in-law.Moved back from [place].   Cheated on BF in April. "trying to work it out". Health is ok. No new health problems. Started new therapy. To go back next week. No new illnesses.   Relationship with most family is ok. Problems with dad ongoing. Stayed with family for some time before reconciling with boyfriend. Supportive relationship. No maltreatment. Good relationship with mom, 2 brothers, "so-so with sister", not much with dad (who has been quite critical; dad also doesn't approve of her bf who is black. Dad is also an alcoholic).     HPI: 23 y/o WF presented for initial evaluation, referred by Hilda Kennedy MD, in Internal Medicine, with chief complaint of chronic generalized anxiety, sleep deficits, frequent tearfulness, 40 lbs weight gain over approximately the past year. Reporting difficulty following through to complete tasks but considers it laziness & procrastination, not particularly focus difficulty. Endorsed difficulty with motivation with many tasks, as long as she can recall. Hated school both socially & academically. Pt described distinct family history of anxiety in both parents & sister. Pt stated she dropped out of the 11th grade secondary to anxiety & quickly obtained her GED. Reported stress in family from other's alcohol use problems & his negativity & intrusiveness when under the influence. Pt said she started to distance herself as a teen. Reported also a lot of racial tension as pt has dated inter-racially & father angrily disapproved and been insulting. She has been in her current committed relationship the past 5 years, & her boyfriend is Af. American. Pt said she has been unable to maintain employment due to her anxiety, bailing out of jobs as " a result of insecurity on new jobs. She said she is supported by her mother & boyfriend. Semi-active Holiness but will watch services on TV rather than face people live in Judaism services. Pt denied any si/hi, psychosis, cognitive deficit, rages, mood swings, or substance use problems. Identified therapeutic goals include reducing anxiety & improving coping skills for anxiety & interpersonal conflict.      Symptoms:   Mood: tearfulness and poor motivation  Anxiety: excessive anxiety/worry and social phobia, decreased sleep  Substance abuse: denied  Cognitive functioning: denied  Health behaviors: noncontributory     Psychiatric history: psychotropic management by PCP     Medical history: 40 pounds weight gain in the past year; currently moderately obese; kidney stones; denied any other physical/medical conditions     Family history of psychiatric illness: Mother, father, and sister all markedly anxious; father also with chronic alcohol use problems. Paternal grandfather also reported to be alcoholic.     Social history: Grew up in , the youngest of 4 siblings; has two brothers & a sister. Raised by both parents who remain together. Pt reported father's alcohol abuse led to many conflicts & tensions in the family home; pt feeling undermined, judged, not at all affirmed by him. She reported hating school, due in significant part to her anxiety. Reports chronic fear of judgment & inadequacy; impulse is to avoid social situations. Described father as having always been negative & judgmental as long as she can remember. Said father's mother was also the same way, very negative, prejudiced, & controlling. Pt very aware of racist/racially intollerant attitudes in her father & paternal grandparents. Reports she dropped out of school & promptly obtained her GED at age 15. Reported 2 committed relationships, the 1st one ending at age 17; said it was a bad relationship. Has been with current BF the past 5 years; describing  him as supportive & understanding  Reported both relationships have been interracial, evoking much conflict with her father & his side of the family. She has just avoided him as much as possible. Pt unemployed, feeling unable to tolerate the social aspect of work settings. Reports being financially supported by boyfriend & her mother. No children; never . No  experience.       Substance use: Alcohol: infrequent   Drugs: sporadic marijuana use from age 13 to current; last reported use August 17th.   Tobacco: former smoker age 13 to 18   Caffeine: none    Review Of Systems:     GENERAL:  No weight gain or loss  SKIN:  No rashes or lacerations  HEAD:  No headaches  CHEST:  No shortness of breath, hyperventilation or cough  CARDIOVASCULAR:  No tachycardia or chest pain  ABDOMEN:  No nausea, vomiting, pain, constipation or diarrhea  URINARY:  No frequency, dysuria or sexual dysfunction  ENDOCRINE:  No polydipsia, polyuria  MUSCULOSKELETAL:  No pain or stiffness of the joints  NEUROLOGIC:  No weakness, sensory changes, seizures, confusion, memory loss, tremor or other abnormal movements    Current Evaluation:     Nutritional Screening: Considering the patient's height and weight, medications, medical history and preferences, should a referral be made to the dietitian? no    Constitutional  Vitals:  Most recent vital signs, dated less than 90 days prior to this appointment, were not reviewed.       General:  unremarkable, age appropriate     Musculoskeletal  Muscle Strength/Tone:  no tremor, no tic   Gait & Station:  non-ataxic     Psychiatric  Appearance: casually dressed & groomed;   Behavior: calm,   Cooperation: cooperative with assessment  Speech: normal rate, volume, tone  Thought Process: linear, goal-directed  Thought Content: No suicidal or homicidal ideation; no delusions  Affect: anxious  Mood: anxious  Perceptions: No auditory or visual hallucinations  Level of Consciousness: alert throughout  interview  Insight: fair  Cognition: Oriented to person, place, time, & situation  Memory: no apparent deficits to general clinical interview; not formally assessed  Attention/Concentration: no apparent deficits to general clinical interview; not formally assessed  Fund of Knowledge: average by vocabulary/education    Laboratory Data  No visits with results within 1 Month(s) from this visit.   Latest known visit with results is:   Office Visit on 06/23/2021   Component Date Value Ref Range Status    Final Pathologic Diagnosis 06/23/2021    Final                    Value:Specimen Adequacy  Satisfactory for interpretation. Endocervical component is present.  [place] Category  Negative for intraepithelial lesion or malignancy.  Inflammation and bacteria present.      Disclaimer 06/23/2021    Final                    Value:The Pap smear is a screening test that aids in the detection of cervical  cancer and cancer precursors. Both false positive and false negative results  can occur. The test should be used at regular intervals, and positive results  should be confirmed before definitive therapy.  This liquid based specimen is processed using the  or  Thin PrepPAP  System. This specimen has been analyzed by the ThinPrep Imaging System  (Stream[employer]), an automated imaging and review system which assists  the laboratory in evaluating cells on ThinPrep PAP tests. Following automated  imaging, selected fields from every slide are reviewed by a cytotechnologist  and/or pathologist.  Screening was performed at [facility], [address], [place] [address].      Trichomonas vaginalis 06/23/2021 Negative  Negative Final    Candida sp 06/23/2021 Negative  Negative Final    Kay glabrata DNA 06/23/2021 Negative  Negative Final    Kay krusei DNA 06/23/2021 Negative  Negative Final    Bacterial vaginosis DNA 06/23/2021 Positive (A)  Negative Final      Medications  Outpatient Encounter Medications as of 4/7/2025   Medication Sig Dispense Refill    ALPRAZolam (XANAX) 0.5 MG tablet Take 1 tablet daily as needed for anxiety. 30 tablet 2    clonazePAM (KLONOPIN) 0.125 MG disintegrating tablet Take 1 tablet (0.125 mg total) by mouth every evening. 90 tablet 0    sertraline (ZOLOFT) 100 MG tablet Take 1/2 tablet daily for the first seven days then 1 tablet daily thereafter 30 tablet 3    [DISCONTINUED] ALPRAZolam (XANAX) 0.5 MG tablet Take 1 tablet daily as needed for anxiety. 30 tablet 2    [DISCONTINUED] fluvoxaMINE (LUVOX) 100 MG tablet Take 1 tablet at bedtime. 90 tablet 1     No facility-administered encounter medications on file as of 4/7/2025.     Assessment - Diagnosis - Goals:     Impression: 28 y/o F with hx of severe and pervasive anxiety, dysfunction in multiple life roles including occupational dysfunction. Has had limited previous treatment, modest benefits from xanax and psychotherapy. Some benefits from duloxetine, but ongoing symptoms, no better response at higher doses. Insufficient response to escitalopram. Fluvoxamine more helpful initially, symptoms worse since last visit.     Dx: MDD, Generalized anxiety disorder, insomnia    Treatment Goals:  Specify outcomes written in observable, behavioral terms: reduce symptoms by self-reports and scales. Improve functioning.     Treatment Plan/Recommendations:   Sertraline trial. Xanax prn.   Trazodone prn.  Psychotherapy prn.   Discussed risks, benefits, and alternatives to treatment plan documented above with patient. I answered all patient questions related to this plan and patient expressed understanding and agreement.     Return to Clinic:     ELAINE Ga MD  Psychiatry  [facility]

## 2025-07-16 ENCOUNTER — OFFICE VISIT (OUTPATIENT)
Dept: PSYCHIATRY | Facility: CLINIC | Age: 30
End: 2025-07-16
Payer: MEDICAID

## 2025-07-16 DIAGNOSIS — G47.00 INSOMNIA, UNSPECIFIED TYPE: ICD-10-CM

## 2025-07-16 DIAGNOSIS — G47.54 PARASOMNIA IN CONDITIONS CLASSIFIED ELSEWHERE: Primary | ICD-10-CM

## 2025-07-16 DIAGNOSIS — F41.1 GAD (GENERALIZED ANXIETY DISORDER): ICD-10-CM

## 2025-07-16 PROCEDURE — 99214 OFFICE O/P EST MOD 30 MIN: CPT | Mod: S$PBB,AF,HB, | Performed by: PSYCHIATRY & NEUROLOGY

## 2025-07-16 RX ORDER — SERTRALINE HYDROCHLORIDE 100 MG/1
TABLET, FILM COATED ORAL
Qty: 90 TABLET | Refills: 1 | Status: SHIPPED | OUTPATIENT
Start: 2025-07-16

## 2025-07-16 NOTE — PROGRESS NOTES
"Outpatient Psychiatry Follow-up Visit (MD/NP)    7/16/2025    Cony Lora, a 30 y.o. female, presenting for follow-up visit. Met with patient.    Reason for Encounter: Follow-up, anxiety, depression, insomnia    History of Present Illness:     Patient presents for a follow-up visit to discuss her current mental health status and medication management. It has been a few months since her last appointment. 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 She expresses satisfaction with her new position and job overall. Patient describes her mental health as "pretty good" and reports feeling on an "even keel." She expresses satisfaction with her current medication, stating it provides a "good balance" for her mental health.    Patient reports experiencing some sleep disturbances, including a few minor parasomnia episodes described as "acting out dreams," increased teeth clenching during sleep, and overall poor sleep quality. She attributes these issues to potential stress from her new job position and increased work hours, rather than medication side effects. Patient notes that the parasomnia episodes are less severe than previous experiences.    Patient mentions having Xanax prescribed, but reports infrequent use. She still has one refill remaining, indicating good management of anxiety symptoms.    Patient reports that her overall health is good and has improved since the last visit. She denies any new or worsening mental health problems, medication changes, or significant side effects from her current medication regimen.    MEDICATIONS:  Patient is on Zoloft, taken orally. She is also on Xanax, taken orally as needed.    FAMILY HISTORY:  Family history is significant for health problems mentioned in her in-laws.    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 She " received a $4 raise and is enjoying her job. She is now working full-time, which has led to increased responsibility and potentially less sleep.         Review Of Systems:     GENERAL:  No weight gain or loss  SKIN:  No rashes or lacerations  HEAD:  No headaches  EYES:  No exophthalmos, jaundice or blindness  EARS:  No dizziness, tinnitus or hearing loss  NOSE:  No changes in smell  MOUTH & THROAT:  No dyskinetic movements or obvious goiter  CHEST:  No shortness of breath, hyperventilation or cough  CARDIOVASCULAR:  No tachycardia or chest pain  ABDOMEN:  No nausea, vomiting, pain, constipation or diarrhea  URINARY:  No frequency, dysuria or sexual dysfunction  ENDOCRINE:  No polydipsia, polyuria  MUSCULOSKELETAL:  No pain or stiffness of the joints  NEUROLOGIC:  No weakness, sensory changes, seizures, confusion, memory loss, tremor or other abnormal movements    Current Evaluation:     Nutritional Screening: Considering the patient's height and weight, medications, medical history and preferences, should a referral be made to the dietitian? no    Constitutional  Vitals:  Most recent vital signs, dated less than 90 days prior to this appointment, were reviewed.       General:  unremarkable, age appropriate     Musculoskeletal  Muscle Strength/Tone:  no tremor, no tic   Gait & Station:  non-ataxic     Psychiatric  Appearance: casually dressed & groomed;   Behavior: calm,   Cooperation: cooperative with assessment  Speech: normal rate, volume, tone  Thought Process: linear, goal-directed  Thought Content: No suicidal or homicidal ideation; no delusions  Affect: normal range  Mood: euthymic  Perceptions: No auditory or visual hallucinations  Level of Consciousness: alert throughout interview  Insight: fair  Cognition: Oriented to person, place, time, & situation  Memory: no apparent deficits to general clinical interview; not formally assessed  Attention/Concentration: no apparent deficits to general clinical  interview; not formally assessed  Fund of Knowledge: average by vocabulary/education    Laboratory Data  No visits with results within 1 Month(s) from this visit.   Latest known visit with results is:   Office Visit on 06/23/2021   Component Date Value Ref Range Status    Final Pathologic Diagnosis 06/23/2021    Final                    Value:Specimen Adequacy  Satisfactory for interpretation. Endocervical component is present.  [place] Category  Negative for intraepithelial lesion or malignancy.  Inflammation and bacteria present.      Disclaimer 06/23/2021    Final                    Value:The Pap smear is a screening test that aids in the detection of cervical  cancer and cancer precursors. Both false positive and false negative results  can occur. The test should be used at regular intervals, and positive results  should be confirmed before definitive therapy.  This liquid based specimen is processed using the  or T5[employer]ackr Thin PrepPAP  System. This specimen has been analyzed by the ThinPrep Imaging System  (Anedot), an automated imaging and review system which assists  the laboratory in evaluating cells on ThinPrep PAP tests. Following automated  imaging, selected fields from every slide are reviewed by a cytotechnologist  and/or pathologist.  Screening was performed at [facility], [facility] [place] [address].      Trichomonas vaginalis 06/23/2021 Negative  Negative Final    Candida sp 06/23/2021 Negative  Negative Final    Kay glabrata DNA 06/23/2021 Negative  Negative Final    Kay krusei DNA 06/23/2021 Negative  Negative Final    Bacterial vaginosis DNA 06/23/2021 Positive (A)  Negative Final       Medications  Encounter Medications[1]    Assessment - Diagnosis - Goals:     Impression:       ICD-10-CM ICD-9-CM   1. 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  Evaluated current medication regimen, determining it to be effective with good balance.  Considered parasomnia episodes as potentially related to increased stress from new job position and lack of sleep rather than medication side effect.  Maintained current treatment plan without changes.  Noted infrequent use of Xanax as indicative of good symptom control.  Discussed potential for identifying medication side effects by occasionally skipping doses and observing changes.    F32.9 MAJOR DEPRESSIVE DISORDER, SINGLE EPISODE, UNSPECIFIED:  1. Continued Zoloft.    F41.9 ANXIETY DISORDER, UNSPECIFIED:  1. Continued Xanax with no changes made.  2. Follow up in December, before Christmas.  3. Contact the office if Xanax supply runs low and refill is needed.    G47.50 PARASOMNIA, UNSPECIFIED:  1. Recommend experimenting with skipping medication occasionally if parasomnia symptoms worsen, to determine if it is a side effect.  2. Explained concept of sleep hygiene and its importance for improving sleep quality.  3. Provided examples of sleep hygiene practices, including: Dimming lights before bedtime, Taking a bath, Listening to soothing music, Using essential oils, Reading non-stimulating material, Putting devices on "do not disturb" mode, Creating a dark, cool, and quiet sleep environment.  4. Patient to implement sleep hygiene practices to improve sleep quality.  Return to Clinic: 2 months    C. Gorge Ga MD  Psychiatry  [facility] [address]  [phone]    This note was generated with the assistance of ambient listening technology. Verbal consent was obtained by the patient and accompanying visitor(s) for the recording of patient " appointment to facilitate this note. I attest to having reviewed and edited the generated note for accuracy, though some syntax or spelling errors may persist. Please contact the author of this note for any clarification.              [1]   Outpatient Encounter Medications as of 7/16/2025   Medication Sig Dispense Refill    ALPRAZolam (XANAX) 0.5 MG tablet Take 1 tablet daily as needed for anxiety. 30 tablet 2    clonazePAM (KLONOPIN) 0.125 MG disintegrating tablet Take 1 tablet (0.125 mg total) by mouth every evening. 90 tablet 0    sertraline (ZOLOFT) 100 MG tablet Take 1 tablet daily 90 tablet 1    [DISCONTINUED] sertraline (ZOLOFT) 100 MG tablet Take 1/2 tablet daily for the first seven days then 1 tablet daily thereafter 30 tablet 3     No facility-administered encounter medications on file as of 7/16/2025.